# Patient Record
Sex: MALE | Race: WHITE | NOT HISPANIC OR LATINO | Employment: OTHER | URBAN - METROPOLITAN AREA
[De-identification: names, ages, dates, MRNs, and addresses within clinical notes are randomized per-mention and may not be internally consistent; named-entity substitution may affect disease eponyms.]

---

## 2018-04-24 ENCOUNTER — OFFICE VISIT CONVERTED (OUTPATIENT)
Dept: SURGERY | Facility: CLINIC | Age: 38
End: 2018-04-24
Attending: PHYSICIAN ASSISTANT

## 2019-01-23 ENCOUNTER — HOSPITAL ENCOUNTER (OUTPATIENT)
Dept: URGENT CARE | Facility: CLINIC | Age: 39
Discharge: HOME | End: 2019-01-23

## 2019-01-25 LAB — BACTERIA SPEC AEROBE CULT: NORMAL

## 2019-03-22 ENCOUNTER — HOSPITAL ENCOUNTER (OUTPATIENT)
Dept: URGENT CARE | Facility: CLINIC | Age: 39
Discharge: HOME | End: 2019-03-22

## 2019-07-09 ENCOUNTER — HOSPITAL ENCOUNTER (OUTPATIENT)
Dept: URGENT CARE | Facility: CLINIC | Age: 39
Discharge: HOME | End: 2019-07-09

## 2019-11-09 ENCOUNTER — HOSPITAL ENCOUNTER (OUTPATIENT)
Dept: URGENT CARE | Facility: CLINIC | Age: 39
Discharge: HOME | End: 2019-11-09
Attending: NURSE PRACTITIONER

## 2020-02-08 ENCOUNTER — HOSPITAL ENCOUNTER (OUTPATIENT)
Dept: URGENT CARE | Facility: CLINIC | Age: 40
Discharge: HOME | End: 2020-02-08

## 2021-05-16 VITALS — BODY MASS INDEX: 29.45 KG/M2 | WEIGHT: 210.37 LBS | RESPIRATION RATE: 12 BRPM | HEIGHT: 71 IN

## 2021-09-05 ENCOUNTER — APPOINTMENT (OUTPATIENT)
Dept: GENERAL RADIOLOGY | Facility: HOSPITAL | Age: 41
End: 2021-09-05

## 2021-09-05 ENCOUNTER — HOSPITAL ENCOUNTER (INPATIENT)
Facility: HOSPITAL | Age: 41
LOS: 6 days | Discharge: HOME | End: 2021-09-11
Attending: EMERGENCY MEDICINE | Admitting: INTERNAL MEDICINE

## 2021-09-05 DIAGNOSIS — U07.1 PNEUMONIA DUE TO COVID-19 VIRUS: Primary | ICD-10-CM

## 2021-09-05 DIAGNOSIS — J12.82 PNEUMONIA DUE TO COVID-19 VIRUS: Primary | ICD-10-CM

## 2021-09-05 DIAGNOSIS — J96.01 ACUTE RESPIRATORY FAILURE WITH HYPOXIA (HCC): ICD-10-CM

## 2021-09-05 PROBLEM — Z72.0 TOBACCO ABUSE: Status: ACTIVE | Noted: 2021-09-05

## 2021-09-05 LAB
ALBUMIN SERPL-MCNC: 3.8 G/DL (ref 3.5–5.2)
ALBUMIN SERPL-MCNC: 3.9 G/DL (ref 3.5–5.2)
ALBUMIN/GLOB SERPL: 1.1 G/DL
ALP SERPL-CCNC: 53 U/L (ref 39–117)
ALP SERPL-CCNC: 55 U/L (ref 39–117)
ALT SERPL W P-5'-P-CCNC: 88 U/L (ref 1–41)
ALT SERPL W P-5'-P-CCNC: 91 U/L (ref 1–41)
ANION GAP SERPL CALCULATED.3IONS-SCNC: 12.3 MMOL/L (ref 5–15)
AST SERPL-CCNC: 46 U/L (ref 1–40)
AST SERPL-CCNC: 46 U/L (ref 1–40)
BASOPHILS # BLD AUTO: 0.07 10*3/MM3 (ref 0–0.2)
BASOPHILS NFR BLD AUTO: 0.4 % (ref 0–1.5)
BILIRUB CONJ SERPL-MCNC: <0.2 MG/DL (ref 0–0.3)
BILIRUB INDIRECT SERPL-MCNC: ABNORMAL MG/DL
BILIRUB SERPL-MCNC: 0.3 MG/DL (ref 0–1.2)
BILIRUB SERPL-MCNC: 0.3 MG/DL (ref 0–1.2)
BUN SERPL-MCNC: 16 MG/DL (ref 6–20)
BUN/CREAT SERPL: 17.4 (ref 7–25)
CALCIUM SPEC-SCNC: 9.1 MG/DL (ref 8.6–10.5)
CHLORIDE SERPL-SCNC: 101 MMOL/L (ref 98–107)
CO2 SERPL-SCNC: 22.7 MMOL/L (ref 22–29)
CREAT SERPL-MCNC: 0.92 MG/DL (ref 0.76–1.27)
CREAT SERPL-MCNC: 0.96 MG/DL (ref 0.76–1.27)
CRP SERPL-MCNC: 5.65 MG/DL (ref 0–0.5)
D DIMER PPP FEU-MCNC: 0.83 MG/L (FEU) (ref 0–0.59)
DEPRECATED RDW RBC AUTO: 43 FL (ref 37–54)
EOSINOPHIL # BLD AUTO: 0 10*3/MM3 (ref 0–0.4)
EOSINOPHIL NFR BLD AUTO: 0 % (ref 0.3–6.2)
ERYTHROCYTE [DISTWIDTH] IN BLOOD BY AUTOMATED COUNT: 13.1 % (ref 12.3–15.4)
FERRITIN SERPL-MCNC: 242.2 NG/ML (ref 30–400)
GFR SERPL CREATININE-BSD FRML MDRD: 87 ML/MIN/1.73
GFR SERPL CREATININE-BSD FRML MDRD: 92 ML/MIN/1.73
GLOBULIN UR ELPH-MCNC: 3.4 GM/DL
GLUCOSE SERPL-MCNC: 153 MG/DL (ref 65–99)
HCT VFR BLD AUTO: 47.6 % (ref 37.5–51)
HGB BLD-MCNC: 16.2 G/DL (ref 13–17.7)
HOLD SPECIMEN: NORMAL
HOLD SPECIMEN: NORMAL
IMM GRANULOCYTES # BLD AUTO: 0.84 10*3/MM3 (ref 0–0.05)
IMM GRANULOCYTES NFR BLD AUTO: 4.9 % (ref 0–0.5)
LDH SERPL-CCNC: 419 U/L (ref 135–225)
LYMPHOCYTES # BLD AUTO: 0.57 10*3/MM3 (ref 0.7–3.1)
LYMPHOCYTES NFR BLD AUTO: 3.3 % (ref 19.6–45.3)
MCH RBC QN AUTO: 30.4 PG (ref 26.6–33)
MCHC RBC AUTO-ENTMCNC: 34 G/DL (ref 31.5–35.7)
MCV RBC AUTO: 89.3 FL (ref 79–97)
MONOCYTES # BLD AUTO: 0.78 10*3/MM3 (ref 0.1–0.9)
MONOCYTES NFR BLD AUTO: 4.5 % (ref 5–12)
NEUTROPHILS NFR BLD AUTO: 14.94 10*3/MM3 (ref 1.7–7)
NEUTROPHILS NFR BLD AUTO: 86.9 % (ref 42.7–76)
NRBC BLD AUTO-RTO: 0.2 /100 WBC (ref 0–0.2)
NT-PROBNP SERPL-MCNC: 200.7 PG/ML (ref 0–450)
PLATELET # BLD AUTO: 313 10*3/MM3 (ref 140–450)
PMV BLD AUTO: 11 FL (ref 6–12)
POTASSIUM SERPL-SCNC: 4.3 MMOL/L (ref 3.5–5.2)
PROCALCITONIN SERPL-MCNC: 0.08 NG/ML (ref 0–0.25)
PROCALCITONIN SERPL-MCNC: 0.1 NG/ML (ref 0–0.25)
PROT SERPL-MCNC: 7.2 G/DL (ref 6–8.5)
PROT SERPL-MCNC: 7.3 G/DL (ref 6–8.5)
QT INTERVAL: 411 MS
RBC # BLD AUTO: 5.33 10*6/MM3 (ref 4.14–5.8)
SODIUM SERPL-SCNC: 136 MMOL/L (ref 136–145)
TROPONIN T SERPL-MCNC: <0.01 NG/ML (ref 0–0.03)
WBC # BLD AUTO: 17.2 10*3/MM3 (ref 3.4–10.8)
WHOLE BLOOD HOLD SPECIMEN: NORMAL
WHOLE BLOOD HOLD SPECIMEN: NORMAL

## 2021-09-05 PROCEDURE — 93010 ELECTROCARDIOGRAM REPORT: CPT | Performed by: INTERNAL MEDICINE

## 2021-09-05 PROCEDURE — 84484 ASSAY OF TROPONIN QUANT: CPT

## 2021-09-05 PROCEDURE — 85025 COMPLETE CBC W/AUTO DIFF WBC: CPT | Performed by: EMERGENCY MEDICINE

## 2021-09-05 PROCEDURE — 25010000002 LEVOFLOXACIN PER 250 MG: Performed by: INTERNAL MEDICINE

## 2021-09-05 PROCEDURE — 25010000002 DEXAMETHASONE PER 1 MG: Performed by: EMERGENCY MEDICINE

## 2021-09-05 PROCEDURE — 99291 CRITICAL CARE FIRST HOUR: CPT | Performed by: FAMILY MEDICINE

## 2021-09-05 PROCEDURE — 82728 ASSAY OF FERRITIN: CPT | Performed by: FAMILY MEDICINE

## 2021-09-05 PROCEDURE — 82565 ASSAY OF CREATININE: CPT | Performed by: FAMILY MEDICINE

## 2021-09-05 PROCEDURE — 83880 ASSAY OF NATRIURETIC PEPTIDE: CPT

## 2021-09-05 PROCEDURE — 80053 COMPREHEN METABOLIC PANEL: CPT

## 2021-09-05 PROCEDURE — 94799 UNLISTED PULMONARY SVC/PX: CPT

## 2021-09-05 PROCEDURE — 86140 C-REACTIVE PROTEIN: CPT | Performed by: FAMILY MEDICINE

## 2021-09-05 PROCEDURE — 87040 BLOOD CULTURE FOR BACTERIA: CPT | Performed by: FAMILY MEDICINE

## 2021-09-05 PROCEDURE — 71045 X-RAY EXAM CHEST 1 VIEW: CPT

## 2021-09-05 PROCEDURE — 25010000002 ENOXAPARIN PER 10 MG

## 2021-09-05 PROCEDURE — 85379 FIBRIN DEGRADATION QUANT: CPT | Performed by: FAMILY MEDICINE

## 2021-09-05 PROCEDURE — 87040 BLOOD CULTURE FOR BACTERIA: CPT | Performed by: EMERGENCY MEDICINE

## 2021-09-05 PROCEDURE — 84145 PROCALCITONIN (PCT): CPT | Performed by: INTERNAL MEDICINE

## 2021-09-05 PROCEDURE — 25010000002 DEXAMETHASONE PER 1 MG: Performed by: FAMILY MEDICINE

## 2021-09-05 PROCEDURE — 99285 EMERGENCY DEPT VISIT HI MDM: CPT

## 2021-09-05 PROCEDURE — 80076 HEPATIC FUNCTION PANEL: CPT | Performed by: FAMILY MEDICINE

## 2021-09-05 PROCEDURE — 93005 ELECTROCARDIOGRAM TRACING: CPT | Performed by: EMERGENCY MEDICINE

## 2021-09-05 PROCEDURE — 84145 PROCALCITONIN (PCT): CPT | Performed by: FAMILY MEDICINE

## 2021-09-05 PROCEDURE — 83615 LACTATE (LD) (LDH) ENZYME: CPT | Performed by: FAMILY MEDICINE

## 2021-09-05 PROCEDURE — XW033E5 INTRODUCTION OF REMDESIVIR ANTI-INFECTIVE INTO PERIPHERAL VEIN, PERCUTANEOUS APPROACH, NEW TECHNOLOGY GROUP 5: ICD-10-PCS | Performed by: INTERNAL MEDICINE

## 2021-09-05 RX ORDER — SODIUM CHLORIDE 0.9 % (FLUSH) 0.9 %
10 SYRINGE (ML) INJECTION AS NEEDED
Status: DISCONTINUED | OUTPATIENT
Start: 2021-09-05 | End: 2021-09-11 | Stop reason: HOSPADM

## 2021-09-05 RX ORDER — SODIUM CHLORIDE 0.9 % (FLUSH) 0.9 %
10 SYRINGE (ML) INJECTION AS NEEDED
Status: DISCONTINUED | OUTPATIENT
Start: 2021-09-05 | End: 2021-09-05

## 2021-09-05 RX ORDER — CALCIUM CARBONATE 200(500)MG
2 TABLET,CHEWABLE ORAL 2 TIMES DAILY PRN
Status: DISCONTINUED | OUTPATIENT
Start: 2021-09-05 | End: 2021-09-11 | Stop reason: HOSPADM

## 2021-09-05 RX ORDER — DEXAMETHASONE SODIUM PHOSPHATE 10 MG/ML
10 INJECTION INTRAMUSCULAR; INTRAVENOUS ONCE
Status: COMPLETED | OUTPATIENT
Start: 2021-09-05 | End: 2021-09-05

## 2021-09-05 RX ORDER — ACETAMINOPHEN 325 MG/1
650 TABLET ORAL EVERY 4 HOURS PRN
Status: DISCONTINUED | OUTPATIENT
Start: 2021-09-05 | End: 2021-09-11 | Stop reason: HOSPADM

## 2021-09-05 RX ORDER — DEXAMETHASONE SODIUM PHOSPHATE 10 MG/ML
6 INJECTION INTRAMUSCULAR; INTRAVENOUS DAILY
Status: DISCONTINUED | OUTPATIENT
Start: 2021-09-05 | End: 2021-09-07

## 2021-09-05 RX ORDER — CHOLECALCIFEROL (VITAMIN D3) 125 MCG
5 CAPSULE ORAL NIGHTLY PRN
Status: DISCONTINUED | OUTPATIENT
Start: 2021-09-05 | End: 2021-09-11 | Stop reason: HOSPADM

## 2021-09-05 RX ORDER — ONDANSETRON 4 MG/1
4 TABLET, FILM COATED ORAL EVERY 6 HOURS PRN
Status: DISCONTINUED | OUTPATIENT
Start: 2021-09-05 | End: 2021-09-11 | Stop reason: HOSPADM

## 2021-09-05 RX ORDER — ACETAMINOPHEN 160 MG/5ML
650 SOLUTION ORAL EVERY 4 HOURS PRN
Status: DISCONTINUED | OUTPATIENT
Start: 2021-09-05 | End: 2021-09-11 | Stop reason: HOSPADM

## 2021-09-05 RX ORDER — POLYETHYLENE GLYCOL 3350 17 G/17G
17 POWDER, FOR SOLUTION ORAL DAILY PRN
Status: DISCONTINUED | OUTPATIENT
Start: 2021-09-05 | End: 2021-09-11 | Stop reason: HOSPADM

## 2021-09-05 RX ORDER — ONDANSETRON 2 MG/ML
4 INJECTION INTRAMUSCULAR; INTRAVENOUS EVERY 6 HOURS PRN
Status: DISCONTINUED | OUTPATIENT
Start: 2021-09-05 | End: 2021-09-11 | Stop reason: HOSPADM

## 2021-09-05 RX ORDER — AMOXICILLIN 250 MG
2 CAPSULE ORAL 2 TIMES DAILY
Status: DISCONTINUED | OUTPATIENT
Start: 2021-09-05 | End: 2021-09-11 | Stop reason: HOSPADM

## 2021-09-05 RX ORDER — SODIUM CHLORIDE 0.9 % (FLUSH) 0.9 %
10 SYRINGE (ML) INJECTION EVERY 12 HOURS SCHEDULED
Status: DISCONTINUED | OUTPATIENT
Start: 2021-09-05 | End: 2021-09-11 | Stop reason: HOSPADM

## 2021-09-05 RX ORDER — BISACODYL 5 MG/1
5 TABLET, DELAYED RELEASE ORAL DAILY PRN
Status: DISCONTINUED | OUTPATIENT
Start: 2021-09-05 | End: 2021-09-11 | Stop reason: HOSPADM

## 2021-09-05 RX ORDER — LEVOFLOXACIN 5 MG/ML
750 INJECTION, SOLUTION INTRAVENOUS EVERY 24 HOURS
Status: DISCONTINUED | OUTPATIENT
Start: 2021-09-05 | End: 2021-09-08

## 2021-09-05 RX ORDER — ACETAMINOPHEN 650 MG/1
650 SUPPOSITORY RECTAL EVERY 4 HOURS PRN
Status: DISCONTINUED | OUTPATIENT
Start: 2021-09-05 | End: 2021-09-11 | Stop reason: HOSPADM

## 2021-09-05 RX ORDER — BISACODYL 10 MG
10 SUPPOSITORY, RECTAL RECTAL DAILY PRN
Status: DISCONTINUED | OUTPATIENT
Start: 2021-09-05 | End: 2021-09-11 | Stop reason: HOSPADM

## 2021-09-05 RX ADMIN — SODIUM CHLORIDE, PRESERVATIVE FREE 10 ML: 5 INJECTION INTRAVENOUS at 20:03

## 2021-09-05 RX ADMIN — MINERAL OIL, PETROLATUM: 425; 568 OINTMENT OPHTHALMIC at 20:50

## 2021-09-05 RX ADMIN — MINERAL OIL, PETROLATUM: 425; 568 OINTMENT OPHTHALMIC at 23:50

## 2021-09-05 RX ADMIN — DEXAMETHASONE SODIUM PHOSPHATE 10 MG: 10 INJECTION INTRAMUSCULAR; INTRAVENOUS at 02:56

## 2021-09-05 RX ADMIN — DEXAMETHASONE SODIUM PHOSPHATE 6 MG: 10 INJECTION INTRAMUSCULAR; INTRAVENOUS at 10:23

## 2021-09-05 RX ADMIN — ENOXAPARIN SODIUM 40 MG: 40 INJECTION SUBCUTANEOUS at 10:22

## 2021-09-05 RX ADMIN — LEVOFLOXACIN 750 MG: 750 INJECTION, SOLUTION INTRAVENOUS at 13:34

## 2021-09-05 NOTE — PLAN OF CARE
Problem: Adult Inpatient Plan of Care  Goal: Plan of Care Review  Outcome: Ongoing, Progressing  Flowsheets (Taken 9/5/2021 1733)  Progress: no change  Plan of Care Reviewed With: patient  Outcome Summary: pt admitted from ER, pt cooperative and pleasant, currently saturating at 88% on airvo  Goal: Patient-Specific Goal (Individualized)  Outcome: Ongoing, Progressing  Goal: Absence of Hospital-Acquired Illness or Injury  Outcome: Ongoing, Progressing  Intervention: Identify and Manage Fall Risk  Flowsheets (Taken 9/5/2021 1508 by Vandana Chakraborty, PCT)  Safety Promotion/Fall Prevention: safety round/check completed  Intervention: Prevent Skin Injury  Flowsheets (Taken 9/5/2021 1508 by Vandana Chakraborty, PCT)  Body Position: position changed independently  Goal: Optimal Comfort and Wellbeing  Outcome: Ongoing, Progressing  Goal: Readiness for Transition of Care  Outcome: Ongoing, Progressing  Intervention: Mutually Develop Transition Plan  Flowsheets  Taken 9/5/2021 1549  Equipment Currently Used at Home: none  Taken 9/5/2021 1517  Transportation Anticipated: family or friend will provide  Patient/Family Anticipated Services at Transition: none  Patient/Family Anticipates Transition to: home with family  Taken 9/5/2021 1513  Equipment Currently Used at Home: none     Problem: Gas Exchange Impaired  Goal: Optimal Gas Exchange  Outcome: Ongoing, Progressing   Goal Outcome Evaluation:  Plan of Care Reviewed With: patient        Progress: no change  Outcome Summary: pt admitted from ER, pt cooperative and bon, currently saturating at 88% on airvo

## 2021-09-05 NOTE — PROGRESS NOTES
[facility]   Hospitalist Progress Note  Date: 2021  Patient Name: Sonu Mensah  : 1980  MRN: [id number]  Date of admission: 2021      Subjective   Subjective     Chief Complaint: SOA    Summary:   Sonu Mensah is a 41 y.o. old male patient with no significant past medical history he was diagnosed with Covid 19 on .  He reports has been having symptoms for the past 8 days.  He had fevers but they resolved..  He reports initial loss of taste and smell, now improving.  He reports cough that is nonproductive.  He reports diarrhea.  He denies nausea or vomiting.  He reports difficulty taking deep breaths.  On arrival to the emergency department the patient was tachypneic, saturating 81% on room air.  Blood work showed an elevated white blood cell count at 17,200.  Chest x-ray showed bilateral infiltrates.  The patient was started on nonrebreather, and subsequently transition to air Vo.       Interval Followup:   --Patient has been stable on interval setting since being here so will be downgraded to floor status  --We will start antibiotics due to white blood cell count of 17,000  --Procalcitonin, respiratory culture, strep and Legionella urine antigen  --Patient has anaphylactic reaction to penicillin so we will start Levaquin  --We will repeat procalcitonin in the morning      Review of Systems  10 out of 14 systems reviewed and all are negative except stated above    Objective   Objective     Vitals:   Temp:  [97.7 °F (36.5 °C)] 97.7 °F (36.5 °C)  Heart Rate:  [62-78] 65  Resp:  [24-25] 25  BP: (107-137)/(57-91) 111/57  Flow (L/min):  [15-40] 40  Physical Exam    Constitutional: Awake, alert, no acute distress   Eyes: Pupils equal, sclerae anicteric, no conjunctival injection   HENT: NCAT, mucous membranes moist   Neck: Supple, no thyromegaly, no lymphadenopathy, trachea midline   Respiratory: Bibasilar crackles on auscultation.  No respiratory distress   Cardiovascular: RRR, no murmurs,  rubs, or gallops, palpable pedal pulses bilaterally   Gastrointestinal: Positive bowel sounds, soft, nontender, nondistended   Musculoskeletal: No bilateral ankle edema, no clubbing or cyanosis to extremities   Psychiatric: Appropriate affect, cooperative   Neurologic: Oriented x 3, strength symmetric in all extremities, Cranial Nerves grossly intact to confrontation, speech clear   Skin: No rashes     Result Review    Result Review:  I have personally reviewed the results from the time of this admission to 9/5/2021 11:16 EDT and agree with these findings:  Reviewed imaging and labs    Assessment/Plan   Assessment / Plan     Assessment/Plan:  Acute hypoxemic respiratory failure  COVID-19 pneumonia  Multifocal pneumonia with leukocytosis  Tobacco abuse    Plan:  --Admit to COVID-19 unit  --Continue air Vo for oxygen saturations less than 90%  --Continue Decadron  --Continue remdesivir  --Start Levaquin due to leukocytosis  --If a.m. procalcitonin and urine studies are negative then can most likely discontinue  --Follow-up blood culture  --Respiratory culture      Discussed plan with RN.    DVT prophylaxis:  Enoxaparin  CODE STATUS:   Level Of Support Discussed With: Patient  Code Status: CPR  Medical Interventions (Level of Support Prior to Arrest): Full        Electronically signed by Amilcar Reed MD, 09/05/21, 11:16 AM EDT.

## 2021-09-05 NOTE — ED PROVIDER NOTES
Time: 1:42 AM EDT  Arrived by: private vehicle  Chief Complaint: shortness of breath  History provided by: patient  History is limited by: N/A    History of Present Illness:  Patient is a 41 y.o. year old male that presents to the emergency department with shortness of breath. Pt was seen on 8/31 at Urgent Care and COVID-19 swab came back positive. Pt breathing got worse yesterday. Pt symptoms started prior to being tested. Pt notes some mild diarrhea. Pt is taking Z-pac.         Shortness of Breath  Severity:  Moderate  Onset quality:  Gradual  Duration:  2 days  Timing:  Constant  Progression:  Improving  Chronicity:  New  Relieved by:  Oxygen  Worsened by:  Nothing  Associated symptoms: no chest pain, no cough, no fever, no neck pain, no rash and no vomiting            Similar Symptoms Previously: yes  Recently seen: yes      Patient Care Team  Primary Care Provider: Mallorie Walker MD      Past Medical History:     Allergies   Allergen Reactions   • Penicillins Anaphylaxis     Past Medical History:   Diagnosis Date   • COVID-19      Past Surgical History:   Procedure Laterality Date   • FRACTURE SURGERY     • HERNIA REPAIR     • LEG SURGERY       History reviewed. No pertinent family history.    Home Medications:  Prior to Admission medications    Medication Sig Start Date End Date Taking? Authorizing Provider   azithromycin (Zithromax Z-Robert) 250 MG tablet Take 2 tablets by mouth on day 1, then 1 tablet daily on days 2-5 8/31/21   Amos Murphy MD   brompheniramine-pseudoephedrine-DM 30-2-10 MG/5ML syrup Take 10 mL by mouth 3 (Three) Times a Day As Needed for Congestion. 8/31/21   Amos Murphy MD   predniSONE (DELTASONE) 10 MG tablet TAKE 6,5,4,3,2,1 TABS ON CONSECUTIVE DAYS 8/31/21   Amos Murphy MD   promethazine (PHENERGAN) 25 MG tablet Take 1 tablet by mouth Every 6 (Six) Hours As Needed for Nausea or Vomiting. 8/31/21   Amos Murphy MD   Testosterone Cypionate (DEPOTESTOTERONE CYPIONATE)  "200 MG/ML injection INJECT 0.5 MLS INTO THE MUSCLE EVERY WEEK 8/15/21   Emergency, Nurse [facility], RN        Social History:   PT  reports that he has never smoked. His smokeless tobacco use includes chew. He reports current alcohol use. He reports that he does not use drugs.    Record Review:  I have reviewed the patient's records in [facility].     Review of Systems  Review of Systems   Constitutional: Negative for chills and fever.   HENT: Negative for nosebleeds.    Eyes: Negative for redness.   Respiratory: Positive for shortness of breath. Negative for cough.    Cardiovascular: Negative for chest pain.   Gastrointestinal: Positive for diarrhea. Negative for vomiting.   Genitourinary: Negative for dysuria and frequency.   Musculoskeletal: Negative for back pain and neck pain.   Skin: Negative for rash.   Neurological: Negative for seizures.        Physical Exam  /79   Pulse 66   Temp 97.7 °F (36.5 °C) (Oral)   Resp 24   Ht 180.3 cm (71\")   SpO2 90%   BMI 29.85 kg/m²     Physical Exam  Vitals and nursing note reviewed.   Constitutional:       General: He is not in acute distress.     Appearance: Normal appearance. He is not toxic-appearing.   HENT:      Head: Normocephalic and atraumatic.      Nose: Nose normal.      Mouth/Throat:      Mouth: Mucous membranes are moist.   Eyes:      General: Lids are normal. No scleral icterus.     Conjunctiva/sclera: Conjunctivae normal.   Cardiovascular:      Rate and Rhythm: Normal rate and regular rhythm.      Pulses: Normal pulses.      Heart sounds: Normal heart sounds. No murmur heard.     Pulmonary:      Effort: Tachypnea and respiratory distress (mild) present.      Breath sounds: Examination of the right-upper field reveals rhonchi. Examination of the left-upper field reveals rhonchi. Examination of the right-middle field reveals rhonchi. Examination of the left-middle field reveals rhonchi. Examination of the right-lower field reveals rhonchi. Examination of the " "left-lower field reveals rhonchi. Rhonchi present.   Chest:      Chest wall: No tenderness.   Abdominal:      Palpations: Abdomen is soft.      Tenderness: There is no abdominal tenderness. There is no guarding or rebound.   Musculoskeletal:         General: No tenderness. Normal range of motion.      Cervical back: Normal range of motion and neck supple. No tenderness.      Right lower leg: No edema.      Left lower leg: No edema.   Skin:     General: Skin is warm and dry.      Findings: No rash.   Neurological:      General: No focal deficit present.      Mental Status: He is alert and oriented to person, place, and time.      Sensory: Sensation is intact. No sensory deficit.      Motor: Motor function is intact. No weakness.   Psychiatric:         Behavior: Behavior normal.                  ED Course  /79   Pulse 66   Temp 97.7 °F (36.5 °C) (Oral)   Resp 24   Ht 180.3 cm (71\")   SpO2 90%   BMI 29.85 kg/m²   Results for orders placed or performed during the hospital encounter of 09/05/21   Comprehensive Metabolic Panel    Specimen: Blood   Result Value Ref Range    Glucose 153 (H) 65 - 99 mg/dL    BUN 16 6 - 20 mg/dL    Creatinine 0.92 0.76 - 1.27 mg/dL    Sodium 136 136 - 145 mmol/L    Potassium 4.3 3.5 - 5.2 mmol/L    Chloride 101 98 - 107 mmol/L    CO2 22.7 22.0 - 29.0 mmol/L    Calcium 9.1 8.6 - 10.5 mg/dL    Total Protein 7.2 6.0 - 8.5 g/dL    Albumin 3.80 3.50 - 5.20 g/dL    ALT (SGPT) 88 (H) 1 - 41 U/L    AST (SGOT) 46 (H) 1 - 40 U/L    Alkaline Phosphatase 53 39 - 117 U/L    Total Bilirubin 0.3 0.0 - 1.2 mg/dL    eGFR Non African Amer 92 >60 mL/min/1.73    Globulin 3.4 gm/dL    A/G Ratio 1.1 g/dL    BUN/Creatinine Ratio 17.4 7.0 - 25.0    Anion Gap 12.3 5.0 - 15.0 mmol/L   BNP    Specimen: Blood   Result Value Ref Range    proBNP 200.7 0.0 - 450.0 pg/mL   Troponin    Specimen: Blood   Result Value Ref Range    Troponin T <0.010 0.000 - 0.030 ng/mL   CBC Auto Differential    Specimen: Blood "   Result Value Ref Range    WBC 17.20 (H) 3.40 - 10.80 10*3/mm3    RBC 5.33 4.14 - 5.80 10*6/mm3    Hemoglobin 16.2 13.0 - 17.7 g/dL    Hematocrit 47.6 37.5 - 51.0 %    MCV 89.3 79.0 - 97.0 fL    MCH 30.4 26.6 - 33.0 pg    MCHC 34.0 31.5 - 35.7 g/dL    RDW 13.1 12.3 - 15.4 %    RDW-SD 43.0 37.0 - 54.0 fl    MPV 11.0 6.0 - 12.0 fL    Platelets 313 140 - 450 10*3/mm3    Neutrophil % 86.9 (H) 42.7 - 76.0 %    Lymphocyte % 3.3 (L) 19.6 - 45.3 %    Monocyte % 4.5 (L) 5.0 - 12.0 %    Eosinophil % 0.0 (L) 0.3 - 6.2 %    Basophil % 0.4 0.0 - 1.5 %    Immature Grans % 4.9 (H) 0.0 - 0.5 %    Neutrophils, Absolute 14.94 (H) 1.70 - 7.00 10*3/mm3    Lymphocytes, Absolute 0.57 (L) 0.70 - 3.10 10*3/mm3    Monocytes, Absolute 0.78 0.10 - 0.90 10*3/mm3    Eosinophils, Absolute 0.00 0.00 - 0.40 10*3/mm3    Basophils, Absolute 0.07 0.00 - 0.20 10*3/mm3    Immature Grans, Absolute 0.84 (H) 0.00 - 0.05 10*3/mm3    nRBC 0.2 0.0 - 0.2 /100 WBC   Ferritin    Specimen: Blood   Result Value Ref Range    Ferritin 242.20 30.00 - 400.00 ng/mL   D-dimer, Quantitative    Specimen: Blood   Result Value Ref Range    D-Dimer, Quantitative 0.83 (H) 0.00 - 0.59 mg/L (FEU)   Lactate Dehydrogenase    Specimen: Blood   Result Value Ref Range     (H) 135 - 225 U/L   Procalcitonin    Specimen: Blood   Result Value Ref Range    Procalcitonin 0.08 0.00 - 0.25 ng/mL   C-reactive Protein    Specimen: Blood   Result Value Ref Range    C-Reactive Protein 5.65 (H) 0.00 - 0.50 mg/dL   Hepatic Function Panel    Specimen: Blood   Result Value Ref Range    Total Protein 7.3 6.0 - 8.5 g/dL    Albumin 3.90 3.50 - 5.20 g/dL    ALT (SGPT) 91 (H) 1 - 41 U/L    AST (SGOT) 46 (H) 1 - 40 U/L    Alkaline Phosphatase 55 39 - 117 U/L    Total Bilirubin 0.3 0.0 - 1.2 mg/dL    Bilirubin, Direct <0.2 0.0 - 0.3 mg/dL    Bilirubin, Indirect     Creatinine, Serum    Specimen: Blood   Result Value Ref Range    Creatinine 0.96 0.76 - 1.27 mg/dL    eGFR Non  Amer 87 >60  mL/min/1.73   ECG 12 Lead   Result Value Ref Range    QT Interval 411 ms   Green Top (Gel)   Result Value Ref Range    Extra Tube Hold for add-ons.    Lavender Top   Result Value Ref Range    Extra Tube hold for add-on    Gold Top - SST   Result Value Ref Range    Extra Tube Hold for add-ons.    Light Blue Top   Result Value Ref Range    Extra Tube hold for add-on      Medications   sodium chloride 0.9 % flush 10 mL (has no administration in time range)   dexamethasone (DECADRON) injection 6 mg (has no administration in time range)   remdesivir 200 mg in sodium chloride 0.9 % 290 mL IVPB (powder vial) (0 mg Intravenous Stopped 9/5/21 0622)     Followed by   remdesivir 100 mg in 270 mL NS (has no administration in time range)   Pharmacy Consult - Remdesivir (has no administration in time range)   artificial tears ophthalmic ointment (has no administration in time range)   artificial tears ophthalmic ointment (has no administration in time range)   dexamethasone (DECADRON) injection 10 mg (10 mg Intravenous Given 9/5/21 0256)     XR Chest 1 View    Result Date: 9/5/2021  Narrative: PROCEDURE: XR CHEST 1 VW  COMPARISON: None.  INDICATIONS: SHORTNESS OF BREATH.  FINDINGS: A single AP upright portable view of the chest is provided for review.  Bilateral infiltrates are seen.  The findings may represent infectious multifocal pneumonia.  No cardiomediastinal enlargement.  No pneumothorax.  No pneumomediastinum.  No pleural effusion.  There is slight pulmonary hypoinflation.  External artifacts obscure detail. Chronic calcified granulomatous disease involves the chest.   CONCLUSION: Bilateral infiltrates are seen.  The findings may represent infectious multifocal pneumonia.       ABBY STEPHENS JR, MD       Electronically Signed and Approved By: ABBY STEPHENS JR, MD on 9/05/2021 at 1:46               Procedures/EKGs:  Procedures          Medical Decision Making:                     [facility]     Patient with COVID-19 pneumonia.   Chest x-ray shows diffuse bilateral infiltrates consistent with multifocal pneumonia.  Patient hypoxic on arrival to the emergency department requiring significant supplemental oxygen to maintain oxygen saturation.  Ultimately patient placed on Vapotherm therapy at 75% with 40 L.  He is comfortable speaking in full sentences after supplemental oxygen.  Patient discussed with hospitalist for admission.  The patient's airway is intact, vital signs, and respiratory status are safe for admission at this time.    Total Critical Care time of 35  minutes. Total critical care time documented does not include time spent on separately billed procedures for services of nurses or physician assistants. I personally saw and examined the patient. I have reviewed all diagnostic interpretations and treatment plans as written. I was present for the key portions of any procedures performed and the inclusive time noted in any critical care statement. Critical care time includes patient management by me, time spent at the patients bedside, time to review lab and imaging results, discussing patient care, documentation in the medical record, and time spent with family or caregiver.      Final diagnoses:   Pneumonia due to COVID-19 virus   Acute respiratory failure with hypoxia (CMS/HCC)        Disposition:  ED Disposition     ED Disposition Condition Comment    Decision to Admit  Level of Care: Critical Care [6]   Diagnosis: Pneumonia due to COVID-19 virus [[id number]]   Admitting Physician: RAMIN VIDAL [[id number]]   Attending Physician: RAMIN VIDAL [[id number]]   Isolate for COVID?: Yes [1]   Certification: I Certify That Inpatient Hospital Services Are Medically Necessary For Greater Than 2 Midnights            Documentation assistance provided by Crystal Burtno acting as scribe for Good Pang MD. Information recorded by the scribe was done at my direction and has been verified and validated by me.         Crystal Burton  09/05/21 0214       Good Pang MD  09/05/21 3570

## 2021-09-05 NOTE — H&P
History and Physical   Sonu Mensah , :  1980, MRN:  [id number]    Chief complaint: Low oxygen saturation    Assessment/Plan       Acute respiratory failure with hypoxia (CMS/HCC)    Pneumonia due to COVID-19 virus    Tobacco abuse      • Acute respiratory failure with hypoxia: The patient presents to the emergency department due to low oxygen saturations at home over the past 2 days.  He reports on the day of presentation his saturations were in the 70s.  He was initially on nonrebreather, was transitioned to air Vo.  He is on 40 L at 75% and saturating 91%.  Secondary to Covid.  Wean oxygen as tolerated.  Inpatient proning.    • Pneumonia due to COVID-19: The patient tested positive for COVID-19 on 2021.  Reports symptoms started on 2021.  He has been monitoring his oxygen saturations at home, have been maintaining in the 70s today.  Started on Decadron, remdesivir.  Order COVID-19 labs.  Reevaluate in the morning for possible escalation of medications.    • Tobacco abuse: The patient uses smokeless tobacco.  Counseled on cessation.    • Clinical course will dictate further medical management.    DVT Prophylaxis: Lovenox  Code Status: Full    Reviewed patients labs and imaging, and discussed with patient and nurse at bedside.    Patient risk level:  Patient comorbidities and risk factors make patient a poor candidate for outpatient management due to concerns of deterioration.    Total time spent on admission of this critically ill patient with Covid pneumonia, acute respiratory failure with hypoxia requiring air Vo: 70 minutes    History of Present illness     Sonu Mensah is a 41 y.o. old male patient who presents with low oxygen saturations, shortness of breath.  The patient has no known medical history.    The patient reports he was diagnosed with COVID-19 on 2021.  He reports has been having symptoms for the past 8 days.  He reports his fevers have resolved.  He reports initial  loss of taste and smell, now improving.  He reports cough that is nonproductive.  He reports diarrhea.  He denies nausea or vomiting.  He reports difficulty taking deep breaths.    ED course: On arrival to the emergency department the patient was tachypneic, saturating 81% on room air.  Blood work showed an elevated white blood cell count at 17,200.  Chest x-ray showed bilateral infiltrates.  The patient was started on nonrebreather, and subsequently transition to air Vo.  The hospital service was asked to admit the patient for further evaluation and management.    Old records were reviewed which revealed no previous hospitalizations.    Review of Systems     General:  Denies fevers, chills, weight loss/gain or night sweats.  HEENT: Denies dysphagia, hearing changes, rhinorrhea, visual changes or nasal congestion.  Respiratory: Reports cough, dyspnea.  Denies sputum changes, wheezing or hemoptysis.  Denies pleuritic chest pain.  Cardiovascular: Denies chest pain, palpitations, dyspnea on exertion or orthopnea. Denies chest pressure. Denies lower extremity edema.  Gastrointestinal: Denies abdominal pain, nausea, vomiting.  Reports mild diarrhea.   Genitourinary: Denies dysuria, frequency or hesitancy. Denies incontinence, urgency or hematuria.  Musculoskeletal: Denies joint effusions, joint tenderness, joint stiffness or myalgias.  Endocrine: Denies heat or cold intolerance.  Reports fatigue.    Hematologic: Denies bleeding, bruising or swollen glands.  Psychiatric: Denies anxiety or depression.  Neurologic: Denies confusion, headaches, numbness or visual changes.  Skin: Denies rash, edema or open wounds.    Past Medical/Surgical/Social/Family History/Allergies     Past Medical History:   Diagnosis Date   • COVID-19        Past Surgical History:   Procedure Laterality Date   • FRACTURE SURGERY     • HERNIA REPAIR     • LEG SURGERY         Social History     Socioeconomic History   • Marital status:      Spouse  name: Not on file   • Number of children: Not on file   • Years of education: Not on file   • Highest education level: Not on file   Tobacco Use   • Smoking status: Never Smoker   • Smokeless tobacco: Current User     Types: Chew   Vaping Use   • Vaping Use: Never used   Substance and Sexual Activity   • Alcohol use: Yes     Comment: occ   • Drug use: Never   • Sexual activity: Defer       History reviewed. No pertinent family history.    Allergies   Allergen Reactions   • Penicillins Anaphylaxis       The above past medical history, past surgical history, social history, family history allergies and home medications were personally reviewed by me today and corrected as needed  Home Medications   (Not in a hospital admission)    Physical Exam   Vital signs:  Temperature Blood Pressure Heart Rate Resp Rate SpO2   Temp: 97.7 °F (36.5 °C) BP: 137/72 Heart Rate: 75 Resp: 24 SpO2: 93 %     HEENT: Head is atraumatic, extraocular movements are intact. Oropharynx is normal.  Neck: Supple, no carotid bruits or cervical lymphadenopathy.  Cardiovascular: Regular rate and rhythm. No murmurs, gallops or rubs. No peripheral edema. No JVD.  Lungs: Bibasilar crackles to auscultation.  Abdomen: Normal bowel sounds in all 4 quadrants. No rebound, guarding or tenderness.    Chest: Normal inspection. Equal rise and fall. No retractions noted.  Constitutional: Well-nourished, well-developed.  Resting comfortably on air Vo.  Lymphatic: No cervical lymphadenopathy.  Extremities: 5/5 upper and lower extremity strength. Normal range of motion.  No clubbing, cyanosis or edema.  Neurological: Alert and oriented x3. No numbness or tingling.  Psychological: Normal mood and affect. Well kempt. Normal eye contact.  Skin: No rash, cellulitis, swelling or bruising.    Labs     Results from last 7 days   Lab Units 09/05/21  0105   WBC 10*3/mm3 17.20*   HEMOGLOBIN g/dL 16.2   HEMATOCRIT % 47.6   PLATELETS 10*3/mm3 313     Results from last 7 days    Lab Units 09/05/21  0105   SODIUM mmol/L 136   POTASSIUM mmol/L 4.3   CHLORIDE mmol/L 101   CO2 mmol/L 22.7   BUN mg/dL 16   CREATININE mg/dL 0.92   CALCIUM mg/dL 9.1     Results from last 7 days   Lab Units 09/05/21  0105   ALT (SGPT) U/L 88*   AST (SGOT) U/L 46*   ALK PHOS U/L 53   BILIRUBIN mg/dL 0.3                   Invalid input(s): CPK, MASSCKMB        I reviewed patient's labs from today and also previous labs while reviewing old records   Imaging and Other procedures     Chest X ray: My independent assessment showed bibasilar infiltrates.  EKG: Pending    I reviewed patient's imaging and other testing from today and also in the past including but not limited to imaging, previous imaging, EKG, previous EKGs, echocardiogram, and other procedures if any and previously done by reviewing old records  Current Medications   Scheduled Meds:dexamethasone, 10 mg, Intravenous, Once      Continuous Infusions:     [unfilled]  09/05/21  02:55 EDT

## 2021-09-06 LAB
ALBUMIN SERPL-MCNC: 3.3 G/DL (ref 3.5–5.2)
ALBUMIN/GLOB SERPL: 1 G/DL
ALP SERPL-CCNC: 55 U/L (ref 39–117)
ALT SERPL W P-5'-P-CCNC: 95 U/L (ref 1–41)
ANION GAP SERPL CALCULATED.3IONS-SCNC: 11 MMOL/L (ref 5–15)
AST SERPL-CCNC: 37 U/L (ref 1–40)
BASOPHILS # BLD AUTO: 0.08 10*3/MM3 (ref 0–0.2)
BASOPHILS NFR BLD AUTO: 0.4 % (ref 0–1.5)
BILIRUB CONJ SERPL-MCNC: <0.2 MG/DL (ref 0–0.3)
BILIRUB SERPL-MCNC: 0.4 MG/DL (ref 0–1.2)
BUN SERPL-MCNC: 18 MG/DL (ref 6–20)
BUN/CREAT SERPL: 15.7 (ref 7–25)
CALCIUM SPEC-SCNC: 9 MG/DL (ref 8.6–10.5)
CHLORIDE SERPL-SCNC: 101 MMOL/L (ref 98–107)
CO2 SERPL-SCNC: 23 MMOL/L (ref 22–29)
CREAT SERPL-MCNC: 1.15 MG/DL (ref 0.76–1.27)
DEPRECATED RDW RBC AUTO: 43.8 FL (ref 37–54)
EOSINOPHIL # BLD AUTO: 0 10*3/MM3 (ref 0–0.4)
EOSINOPHIL NFR BLD AUTO: 0 % (ref 0.3–6.2)
ERYTHROCYTE [DISTWIDTH] IN BLOOD BY AUTOMATED COUNT: 13.2 % (ref 12.3–15.4)
GFR SERPL CREATININE-BSD FRML MDRD: 71 ML/MIN/1.73
GLOBULIN UR ELPH-MCNC: 3.2 GM/DL
GLUCOSE SERPL-MCNC: 90 MG/DL (ref 65–99)
HCT VFR BLD AUTO: 49.4 % (ref 37.5–51)
HGB BLD-MCNC: 16.5 G/DL (ref 13–17.7)
IMM GRANULOCYTES # BLD AUTO: 0.94 10*3/MM3 (ref 0–0.05)
IMM GRANULOCYTES NFR BLD AUTO: 4.6 % (ref 0–0.5)
LARGE PLATELETS: NORMAL
LYMPHOCYTES # BLD AUTO: 1.2 10*3/MM3 (ref 0.7–3.1)
LYMPHOCYTES NFR BLD AUTO: 5.8 % (ref 19.6–45.3)
MAGNESIUM SERPL-MCNC: 2.2 MG/DL (ref 1.6–2.6)
MCH RBC QN AUTO: 30.3 PG (ref 26.6–33)
MCHC RBC AUTO-ENTMCNC: 33.4 G/DL (ref 31.5–35.7)
MCV RBC AUTO: 90.6 FL (ref 79–97)
MONOCYTES # BLD AUTO: 1.16 10*3/MM3 (ref 0.1–0.9)
MONOCYTES NFR BLD AUTO: 5.7 % (ref 5–12)
NEUTROPHILS NFR BLD AUTO: 17.14 10*3/MM3 (ref 1.7–7)
NEUTROPHILS NFR BLD AUTO: 83.5 % (ref 42.7–76)
NRBC BLD AUTO-RTO: 0 /100 WBC (ref 0–0.2)
PLATELET # BLD AUTO: 335 10*3/MM3 (ref 140–450)
PMV BLD AUTO: 11.3 FL (ref 6–12)
POTASSIUM SERPL-SCNC: 4.5 MMOL/L (ref 3.5–5.2)
PROCALCITONIN SERPL-MCNC: 0.06 NG/ML (ref 0–0.25)
PROT SERPL-MCNC: 6.5 G/DL (ref 6–8.5)
RBC # BLD AUTO: 5.45 10*6/MM3 (ref 4.14–5.8)
RBC MORPH BLD: NORMAL
SMALL PLATELETS BLD QL SMEAR: ADEQUATE
SODIUM SERPL-SCNC: 135 MMOL/L (ref 136–145)
WBC # BLD AUTO: 20.52 10*3/MM3 (ref 3.4–10.8)
WBC MORPH BLD: NORMAL

## 2021-09-06 PROCEDURE — 25010000002 DEXAMETHASONE PER 1 MG: Performed by: FAMILY MEDICINE

## 2021-09-06 PROCEDURE — 94799 UNLISTED PULMONARY SVC/PX: CPT

## 2021-09-06 PROCEDURE — 99233 SBSQ HOSP IP/OBS HIGH 50: CPT | Performed by: INTERNAL MEDICINE

## 2021-09-06 PROCEDURE — 80053 COMPREHEN METABOLIC PANEL: CPT | Performed by: INTERNAL MEDICINE

## 2021-09-06 PROCEDURE — 84145 PROCALCITONIN (PCT): CPT | Performed by: INTERNAL MEDICINE

## 2021-09-06 PROCEDURE — 25010000002 ENOXAPARIN PER 10 MG

## 2021-09-06 PROCEDURE — 82248 BILIRUBIN DIRECT: CPT | Performed by: FAMILY MEDICINE

## 2021-09-06 PROCEDURE — 25010000002 LEVOFLOXACIN PER 250 MG: Performed by: INTERNAL MEDICINE

## 2021-09-06 PROCEDURE — 85025 COMPLETE CBC W/AUTO DIFF WBC: CPT | Performed by: INTERNAL MEDICINE

## 2021-09-06 PROCEDURE — 36415 COLL VENOUS BLD VENIPUNCTURE: CPT | Performed by: FAMILY MEDICINE

## 2021-09-06 PROCEDURE — 85007 BL SMEAR W/DIFF WBC COUNT: CPT | Performed by: INTERNAL MEDICINE

## 2021-09-06 PROCEDURE — 83735 ASSAY OF MAGNESIUM: CPT | Performed by: INTERNAL MEDICINE

## 2021-09-06 RX ORDER — TETRAHYDROZOLINE HCL 0.05 %
2 DROPS OPHTHALMIC (EYE) 4 TIMES DAILY PRN
Status: DISCONTINUED | OUTPATIENT
Start: 2021-09-06 | End: 2021-09-11 | Stop reason: HOSPADM

## 2021-09-06 RX ORDER — FLUTICASONE PROPIONATE 50 MCG
2 SPRAY, SUSPENSION (ML) NASAL DAILY
Status: DISCONTINUED | OUTPATIENT
Start: 2021-09-06 | End: 2021-09-11 | Stop reason: HOSPADM

## 2021-09-06 RX ADMIN — MINERAL OIL, PETROLATUM: 425; 568 OINTMENT OPHTHALMIC at 08:48

## 2021-09-06 RX ADMIN — MINERAL OIL, PETROLATUM: 425; 568 OINTMENT OPHTHALMIC at 02:58

## 2021-09-06 RX ADMIN — FLUTICASONE PROPIONATE 2 SPRAY: 50 SPRAY, METERED NASAL at 17:37

## 2021-09-06 RX ADMIN — MINERAL OIL, PETROLATUM: 425; 568 OINTMENT OPHTHALMIC at 16:59

## 2021-09-06 RX ADMIN — SODIUM CHLORIDE, PRESERVATIVE FREE 10 ML: 5 INJECTION INTRAVENOUS at 08:48

## 2021-09-06 RX ADMIN — DEXAMETHASONE SODIUM PHOSPHATE 6 MG: 10 INJECTION INTRAMUSCULAR; INTRAVENOUS at 08:47

## 2021-09-06 RX ADMIN — SODIUM CHLORIDE, PRESERVATIVE FREE 10 ML: 5 INJECTION INTRAVENOUS at 21:11

## 2021-09-06 RX ADMIN — ACETAMINOPHEN 650 MG: 325 TABLET ORAL at 11:23

## 2021-09-06 RX ADMIN — MINERAL OIL, PETROLATUM: 425; 568 OINTMENT OPHTHALMIC at 11:23

## 2021-09-06 RX ADMIN — LEVOFLOXACIN 750 MG: 750 INJECTION, SOLUTION INTRAVENOUS at 11:23

## 2021-09-06 RX ADMIN — ENOXAPARIN SODIUM 40 MG: 40 INJECTION SUBCUTANEOUS at 08:48

## 2021-09-06 RX ADMIN — REMDESIVIR 100 MG: 100 INJECTION, POWDER, LYOPHILIZED, FOR SOLUTION INTRAVENOUS at 16:58

## 2021-09-06 NOTE — PLAN OF CARE
Goal Outcome Evaluation:  Plan of Care Reviewed With: patient        Progress: improving  Outcome Summary: Pt has been improving on the airvo. He is now weaned down to 40 L and 60 %. Still unable to cough anything up. Pt able to rest in between care, very pleasant pt. Rosalva Mortensen RN

## 2021-09-06 NOTE — PROGRESS NOTES
[facility]   Hospitalist Progress Note  Date: 2021  Patient Name: Sonu Mensah  : 1980  MRN: [id number]  Date of admission: 2021      Subjective   Subjective     Chief Complaint: SOA    Summary:   Sonu Mensah is a 41 y.o. old male patient with no significant past medical history he was diagnosed with Covid 19 on .  He reports has been having symptoms for the past 8 days.  He had fevers but they resolved..  He reports initial loss of taste and smell, now improving.  He reports cough that is nonproductive.  He reports diarrhea.  He denies nausea or vomiting.  He reports difficulty taking deep breaths.  On arrival to the emergency department the patient was tachypneic, saturating 81% on room air.  Blood work showed an elevated white blood cell count at 17,200.  Chest x-ray showed bilateral infiltrates.  The patient was started on nonrebreather, and subsequently transition to air Vo.    Currently getting Decadron and remdesivir for COVID-19.  Treating community-acquired pneumonia with Levaquin.     Interval Followup:   --Patient seems to be doing better today with decreasing air Vo requirements  --Continue Decadron and remdesivir  --Continue Levaquin for 5 days to treat community-acquired pneumonia  --Patient states that he feels better no issues    Review of Systems  10 out of 14 systems reviewed and all are negative except stated above    Objective   Objective     Vitals:   Temp:  [97.9 °F (36.6 °C)-98.6 °F (37 °C)] 98.6 °F (37 °C)  Heart Rate:  [56-65] 65  Resp:  [18-20] 20  BP: (121-135)/(72-88) 123/79  Flow (L/min):  [40-45] 40  Physical Exam    Constitutional: Awake, alert, no acute distress   Eyes: Pupils equal, sclerae anicteric, no conjunctival injection   HENT: NCAT, mucous membranes moist   Neck: Supple, no thyromegaly, no lymphadenopathy, trachea midline   Respiratory: Bibasilar crackles on auscultation.  No respiratory distress   Cardiovascular: RRR, no murmurs, rubs, or  gallops, palpable pedal pulses bilaterally   Gastrointestinal: Positive bowel sounds, soft, nontender, nondistended   Musculoskeletal: No bilateral ankle edema, no clubbing or cyanosis to extremities   Psychiatric: Appropriate affect, cooperative   Neurologic: Oriented x 3, strength symmetric in all extremities, Cranial Nerves grossly intact to confrontation, speech clear   Skin: No rashes     Result Review    Result Review:  I have personally reviewed the results from the time of this admission to 9/6/2021 15:16 EDT and agree with these findings:  Reviewed imaging and labs    Assessment/Plan   Assessment / Plan     Assessment/Plan:  Acute hypoxemic respiratory failure  COVID-19 pneumonia  Multifocal pneumonia  Tobacco abuse  Leukocytosis most likely driven by steroids at this time    Plan:  --Admit to COVID-19 unit  --Continue air Vo for oxygen saturations less than 90%  --Continue Decadron  --Continue remdesivir  --Continue Levaquin  --Follow-up blood culture  --Follow-up respiratory culture  --Strep and and Legionella urine antigen      Discussed plan with RN.    DVT prophylaxis:  Enoxaparin  CODE STATUS:   Level Of Support Discussed With: Patient  Code Status: CPR  Medical Interventions (Level of Support Prior to Arrest): Full    Electronically signed by Amilcar Reed MD, 09/06/21, 3:17 PM EDT.

## 2021-09-06 NOTE — CASE MANAGEMENT/SOCIAL WORK
Discharge Planning Assessment   [place]     Patient Name: Sonu Mensah  MRN: [id number]  Today's Date: 9/6/2021    Admit Date: 9/5/2021    Discharge Needs Assessment     Row Name 09/06/21 0931       Living Environment    Lives With  spouse;child(paula), dependent;child(paula), adult    Current Living Arrangements  home/apartment/condo    Primary Care Provided by  self    Provides Primary Care For  no one    Family Caregiver if Needed  child(paula), adult;spouse    Quality of Family Relationships  supportive       Resource/Environmental Concerns    Resource/Environmental Concerns  none       Transition Planning    Patient/Family Anticipates Transition to  home with family    Patient/Family Anticipated Services at Transition  none    Transportation Anticipated  family or friend will provide       Discharge Needs Assessment    Equipment Currently Used at Home  none    Equipment Needed After Discharge  nebulizer;oxygen    Discharge Coordination/Progress  SW placed call to pt to complete assessment. Pt is normally independent at home. Pt has PCP and uses [facility] in [place]. Pt does not have any DME at home. Pt plans to return home at discharge. SW following for possible home oxygen and nebulizer needs.        Discharge Plan    No documentation.       Continued Care and Services - Admitted Since 9/5/2021    Coordination has not been started for this encounter.         Demographic Summary     Row Name 09/06/21 0918       General Information    Admission Type  inpatient    Arrived From  emergency department    Reason for Consult  discharge planning    Preferred Language  English     Used During This Interaction  no        Functional Status     Row Name 09/06/21 0931       Functional Status    Usual Activity Tolerance  excellent    Current Activity Tolerance  excellent       Functional Status, IADL    Medications  independent    Meal Preparation  independent    Housekeeping  independent    Laundry  independent     Shopping  independent        Psychosocial    No documentation.       Abuse/Neglect    No documentation.       Legal    No documentation.       Substance Abuse    No documentation.       Patient Forms    No documentation.           GERARDO Davila

## 2021-09-06 NOTE — PLAN OF CARE
Problem: Adult Inpatient Plan of Care  Goal: Plan of Care Review  Outcome: Ongoing, Progressing  Flowsheets  Taken 9/6/2021 1724 by Mulu Dale RN  Outcome Summary: pt improving on airvo, wife is attentive to pt. no concerns or worries discussed from pt  Taken 9/6/2021 0407 by Rosalva Mortensen RN  Progress: improving  Plan of Care Reviewed With: patient  Goal: Patient-Specific Goal (Individualized)  Outcome: Ongoing, Progressing  Goal: Absence of Hospital-Acquired Illness or Injury  Outcome: Ongoing, Progressing  Intervention: Identify and Manage Fall Risk  Flowsheets  Taken 9/6/2021 1239 by Claudia Sanderson PCT  Safety Promotion/Fall Prevention: safety round/check completed  Taken 9/6/2021 0900 by Mulu Dlae RN  Safety Promotion/Fall Prevention: safety round/check completed  Intervention: Prevent Skin Injury  Flowsheets (Taken 9/6/2021 1239 by Claudia Sanderson PCT)  Body Position: position changed independently  Intervention: Prevent and Manage VTE (venous thromboembolism) Risk  Flowsheets (Taken 9/5/2021 1945 by Rosalva Mortensen, RN)  VTE Prevention/Management: patient refused intervention  Intervention: Prevent Infection  Flowsheets  Taken 9/6/2021 1239 by Claudia Sanderson PCT  Infection Prevention: single patient room provided  Taken 9/6/2021 0900 by Mulu Dale RN  Infection Prevention:   visitors restricted/screened   single patient room provided   personal protective equipment utilized   rest/sleep promoted   equipment surfaces disinfected   hand hygiene promoted  Goal: Optimal Comfort and Wellbeing  Outcome: Ongoing, Progressing  Intervention: Provide Person-Centered Care  Flowsheets (Taken 9/6/2021 0900)  Trust Relationship/Rapport:   care explained   questions answered   thoughts/feelings acknowledged  Goal: Readiness for Transition of Care  Outcome: Ongoing, Progressing  Intervention: Mutually Develop Transition Plan  Flowsheets (Taken 9/6/2021 0931 by Alyssa Dewey MSW)  Equipment  Needed After Discharge:   nebulizer   oxygen  Equipment Currently Used at Home: none  Transportation Anticipated: family or friend will provide  Patient/Family Anticipated Services at Transition: none  Patient/Family Anticipates Transition to: home with family     Problem: Gas Exchange Impaired  Goal: Optimal Gas Exchange  Outcome: Ongoing, Progressing  Intervention: Optimize Oxygenation and Ventilation  Flowsheets (Taken 9/6/2021 0500 by Steph Danielson, PCT)  Head of Bed (HOB): HOB elevated   Goal Outcome Evaluation:  Plan of Care Reviewed With: patient        Progress: no change  Outcome Summary: pt improving on airvo, wife is attentive to pt. no concerns or worries discussed from pt

## 2021-09-07 LAB
ALBUMIN SERPL-MCNC: 3.5 G/DL (ref 3.5–5.2)
ALBUMIN/GLOB SERPL: 1.1 G/DL
ALP SERPL-CCNC: 61 U/L (ref 39–117)
ALT SERPL W P-5'-P-CCNC: 72 U/L (ref 1–41)
ANION GAP SERPL CALCULATED.3IONS-SCNC: 12.7 MMOL/L (ref 5–15)
AST SERPL-CCNC: 23 U/L (ref 1–40)
BILIRUB CONJ SERPL-MCNC: <0.2 MG/DL (ref 0–0.3)
BILIRUB SERPL-MCNC: 0.5 MG/DL (ref 0–1.2)
BUN SERPL-MCNC: 22 MG/DL (ref 6–20)
BUN/CREAT SERPL: 19.5 (ref 7–25)
CALCIUM SPEC-SCNC: 8.7 MG/DL (ref 8.6–10.5)
CHLORIDE SERPL-SCNC: 100 MMOL/L (ref 98–107)
CO2 SERPL-SCNC: 21.3 MMOL/L (ref 22–29)
CREAT SERPL-MCNC: 1.13 MG/DL (ref 0.76–1.27)
DEPRECATED RDW RBC AUTO: 42.6 FL (ref 37–54)
ERYTHROCYTE [DISTWIDTH] IN BLOOD BY AUTOMATED COUNT: 12.9 % (ref 12.3–15.4)
GFR SERPL CREATININE-BSD FRML MDRD: 72 ML/MIN/1.73
GLOBULIN UR ELPH-MCNC: 3.2 GM/DL
GLUCOSE SERPL-MCNC: 76 MG/DL (ref 65–99)
HCT VFR BLD AUTO: 49.3 % (ref 37.5–51)
HGB BLD-MCNC: 16.8 G/DL (ref 13–17.7)
HYPOCHROMIA BLD QL: ABNORMAL
LARGE PLATELETS: ABNORMAL
LYMPHOCYTES # BLD MANUAL: 1.67 10*3/MM3 (ref 0.7–3.1)
LYMPHOCYTES NFR BLD MANUAL: 6 % (ref 19.6–45.3)
LYMPHOCYTES NFR BLD MANUAL: 6 % (ref 5–12)
MAGNESIUM SERPL-MCNC: 2.2 MG/DL (ref 1.6–2.6)
MCH RBC QN AUTO: 30.5 PG (ref 26.6–33)
MCHC RBC AUTO-ENTMCNC: 34.1 G/DL (ref 31.5–35.7)
MCV RBC AUTO: 89.6 FL (ref 79–97)
MONOCYTES # BLD AUTO: 1 10*3/MM3 (ref 0.1–0.9)
NEUTROPHILS # BLD AUTO: 14.04 10*3/MM3 (ref 1.7–7)
NEUTROPHILS NFR BLD MANUAL: 83 % (ref 42.7–76)
NEUTS BAND NFR BLD MANUAL: 1 % (ref 0–5)
NRBC SPEC MANUAL: 2 /100 WBC (ref 0–0.2)
PLATELET # BLD AUTO: 309 10*3/MM3 (ref 140–450)
PMV BLD AUTO: 12.7 FL (ref 6–12)
POTASSIUM SERPL-SCNC: 4 MMOL/L (ref 3.5–5.2)
PROCALCITONIN SERPL-MCNC: 0.03 NG/ML (ref 0–0.25)
PROT SERPL-MCNC: 6.7 G/DL (ref 6–8.5)
RBC # BLD AUTO: 5.5 10*6/MM3 (ref 4.14–5.8)
SMALL PLATELETS BLD QL SMEAR: ADEQUATE
SODIUM SERPL-SCNC: 134 MMOL/L (ref 136–145)
VARIANT LYMPHS NFR BLD MANUAL: 4 % (ref 0–5)
WBC # BLD AUTO: 16.71 10*3/MM3 (ref 3.4–10.8)
WBC MORPH BLD: NORMAL

## 2021-09-07 PROCEDURE — 25010000002 LEVOFLOXACIN PER 250 MG: Performed by: INTERNAL MEDICINE

## 2021-09-07 PROCEDURE — 99291 CRITICAL CARE FIRST HOUR: CPT | Performed by: INTERNAL MEDICINE

## 2021-09-07 PROCEDURE — 85025 COMPLETE CBC W/AUTO DIFF WBC: CPT | Performed by: INTERNAL MEDICINE

## 2021-09-07 PROCEDURE — 84145 PROCALCITONIN (PCT): CPT | Performed by: INTERNAL MEDICINE

## 2021-09-07 PROCEDURE — 85007 BL SMEAR W/DIFF WBC COUNT: CPT | Performed by: INTERNAL MEDICINE

## 2021-09-07 PROCEDURE — 94799 UNLISTED PULMONARY SVC/PX: CPT

## 2021-09-07 PROCEDURE — 80053 COMPREHEN METABOLIC PANEL: CPT | Performed by: INTERNAL MEDICINE

## 2021-09-07 PROCEDURE — 82248 BILIRUBIN DIRECT: CPT | Performed by: FAMILY MEDICINE

## 2021-09-07 PROCEDURE — 25010000002 DEXAMETHASONE PER 1 MG: Performed by: INTERNAL MEDICINE

## 2021-09-07 PROCEDURE — 83735 ASSAY OF MAGNESIUM: CPT | Performed by: INTERNAL MEDICINE

## 2021-09-07 PROCEDURE — 25010000002 ENOXAPARIN PER 10 MG

## 2021-09-07 PROCEDURE — 25010000002 FUROSEMIDE PER 20 MG: Performed by: INTERNAL MEDICINE

## 2021-09-07 PROCEDURE — 94760 N-INVAS EAR/PLS OXIMETRY 1: CPT

## 2021-09-07 PROCEDURE — 99233 SBSQ HOSP IP/OBS HIGH 50: CPT | Performed by: INTERNAL MEDICINE

## 2021-09-07 RX ORDER — ARFORMOTEROL TARTRATE 15 UG/2ML
15 SOLUTION RESPIRATORY (INHALATION)
Status: DISCONTINUED | OUTPATIENT
Start: 2021-09-07 | End: 2021-09-11 | Stop reason: HOSPADM

## 2021-09-07 RX ORDER — BUDESONIDE 0.5 MG/2ML
0.5 INHALANT ORAL
Status: DISCONTINUED | OUTPATIENT
Start: 2021-09-07 | End: 2021-09-11 | Stop reason: HOSPADM

## 2021-09-07 RX ORDER — FUROSEMIDE 10 MG/ML
40 INJECTION INTRAMUSCULAR; INTRAVENOUS EVERY 12 HOURS
Status: DISCONTINUED | OUTPATIENT
Start: 2021-09-07 | End: 2021-09-08

## 2021-09-07 RX ORDER — DEXAMETHASONE SODIUM PHOSPHATE 10 MG/ML
10 INJECTION INTRAMUSCULAR; INTRAVENOUS DAILY
Status: DISCONTINUED | OUTPATIENT
Start: 2021-09-07 | End: 2021-09-11 | Stop reason: HOSPADM

## 2021-09-07 RX ADMIN — BUDESONIDE 0.5 MG: 0.5 INHALANT ORAL at 21:08

## 2021-09-07 RX ADMIN — FLUTICASONE PROPIONATE 2 SPRAY: 50 SPRAY, METERED NASAL at 09:28

## 2021-09-07 RX ADMIN — LEVOFLOXACIN 750 MG: 750 INJECTION, SOLUTION INTRAVENOUS at 11:30

## 2021-09-07 RX ADMIN — SODIUM CHLORIDE, PRESERVATIVE FREE 10 ML: 5 INJECTION INTRAVENOUS at 20:03

## 2021-09-07 RX ADMIN — SODIUM CHLORIDE, PRESERVATIVE FREE 10 ML: 5 INJECTION INTRAVENOUS at 09:28

## 2021-09-07 RX ADMIN — FUROSEMIDE 40 MG: 10 INJECTION, SOLUTION INTRAMUSCULAR; INTRAVENOUS at 09:26

## 2021-09-07 RX ADMIN — REMDESIVIR 100 MG: 100 INJECTION, POWDER, LYOPHILIZED, FOR SOLUTION INTRAVENOUS at 14:14

## 2021-09-07 RX ADMIN — ACETAMINOPHEN 650 MG: 325 TABLET ORAL at 18:42

## 2021-09-07 RX ADMIN — ARFORMOTEROL TARTRATE 15 MCG: 15 SOLUTION RESPIRATORY (INHALATION) at 21:08

## 2021-09-07 RX ADMIN — MINERAL OIL, PETROLATUM: 425; 568 OINTMENT OPHTHALMIC at 09:27

## 2021-09-07 RX ADMIN — ENOXAPARIN SODIUM 40 MG: 40 INJECTION SUBCUTANEOUS at 09:27

## 2021-09-07 RX ADMIN — DEXAMETHASONE SODIUM PHOSPHATE 10 MG: 10 INJECTION INTRAMUSCULAR; INTRAVENOUS at 09:27

## 2021-09-07 RX ADMIN — ACETAMINOPHEN 650 MG: 325 TABLET ORAL at 08:19

## 2021-09-07 RX ADMIN — FUROSEMIDE 40 MG: 10 INJECTION, SOLUTION INTRAMUSCULAR; INTRAVENOUS at 21:49

## 2021-09-07 NOTE — CONSULTS
Pulmonary / Critical Care Consult Note      Patient Name: Sonu Mensah  : 1980  MRN: [id number]  Primary Care Physician:  Mallorie Walker MD  Referring Physician: Devon Ramirez MD  Date of admission: 2021    Subjective   Subjective     Reason for Consult/ Chief Complaint:   Hypoxic respiratory failure d/t COVID-19 infection.    HPI:  Sonu Mensah is a 41 y.o. male without any significant past medical history was diagnosed with COVID-19 on 21 and was given steroids and Zpak as an outpatient.  Both his wife and children have COVID-10.  He is unvaccinated.  He presented to the ED with worsening shortness of breath.  His O2 sats at home were in the 70's.  He reports lost of taste and smell which has returned.  He complains of chills, non-productive cough, and dyspnea on exertion.  He denies any headaches, chest pain, hemoptysis, nausea, or vomiting.  Since admission he has been started on Remdesivir, Dexamethasone, Levaquin, and nebulizers.  Despite this treatment his oxygenation requirements continue to increase and now is on Airvo.  Due to the above our services was consulted for further evaluation and treatment.     Review of Systems  Constitutional symptoms: + fatigue, otherwise denied complaints   Ear, nose, throat: Denied complaints  Cardiovascular:  Denied complaints  Respiratory: + Nonproductive cough, + dyspnea, + GREGG, Denied complaints  Gastrointestinal: Denied complaints  Musculoskeletal: Denied complaints  Genitourinary: Denied complaints  Allergy / Immunology: Denied complaints  Hematologic: Denied complaints  Neurologic: Denied complaints  Skin: Denied complaints  Endocrine: Denied complaints  Psychiatric: Denied complaints    Personal History     Past Medical History:   Diagnosis Date   • COVID-19        Past Surgical History:   Procedure Laterality Date   • FRACTURE SURGERY     • HERNIA REPAIR     • LEG SURGERY         Family History:   No pulmonary comorbidities noted in  primary family members    Social History:  reports that he has never smoked. His smokeless tobacco use includes chew. He reports current alcohol use. He reports that he does not use drugs.    Home Medications:  Testosterone Cypionate, azithromycin, brompheniramine-pseudoephedrine-DM, predniSONE, and promethazine    Allergies:  Allergies   Allergen Reactions   • Penicillins Anaphylaxis       Objective    Objective     Vitals:   Temp:  [97.7 °F (36.5 °C)-98.2 °F (36.8 °C)] 97.9 °F (36.6 °C)  Heart Rate:  [55-79] 73  Resp:  [20-24] 20  BP: (102-128)/(65-84) 123/84  Flow (L/min):  [40] 40    Physical Exam:  Vital Signs Reviewed   General: WDWN male, Awake and Alert, NAD on Airvo    HEENT:  PERRL, EOMI.  OP, nares clear  Neck:  Supple, no JVD, no thyromegaly  Chest:  good aeration, bibasilar crackles, tympanic to percussion bilaterally, mild work of breathing noted on air Vo   CV: RRR, no MGR, pulses 2+, equal  Abd:  Soft, NT, ND, + BS, no HSM  EXT:  no clubbing, no cyanosis, no edema  Neuro:  A&Ox3, CN grossly intact, no focal deficits  Skin: No rashes or lesions noted       Result Review    Result Review:  I have personally reviewed the results from the time of this admission to 9/7/2021 17:20 EDT and agree with these findings:  [x]  Laboratory  [x]  Microbiology  [x]  Radiology  [x]  EKG/Telemetry   []  Cardiology/Vascular   []  Pathology  []  Old records  [x]  Other:  Most notable findings include: WBC 16.71, Cr 1.13, K+ 4.0, Mg+ 2.2, Procal 0.03, ALT 72, AST 23  CXR with bilateral infiltrates  Blood cultures negative    COVID LABS:  Results From Last 14 Days   Lab Units 09/07/21  0411 09/06/21  0619 09/05/21  0105   PROBNP pg/mL  --   --  200.7   CRP mg/dL  --   --  5.65*   D DIMER QUANT mg/L (FEU)  --   --  0.83*   FERRITIN ng/mL  --   --  242.20   LDH U/L  --   --  419*   PROCALCITONIN ng/mL 0.03 0.06 0.10  0.08   TROPONIN T ng/mL  --   --  <0.010       Assessment/Plan   Assessment / Plan     Active Hospital  Problems:  Active Hospital Problems    Diagnosis    • **Acute respiratory failure with hypoxia (CMS/HCC)    • Pneumonia due to COVID-19 virus    • Tobacco abuse    • COVID-19      Impression:  Acute hypoxic respiratory failure secondary to COVID-19 quiring air Vo  ARDS  COVID-19 pneumonia  Question community-acquired pneumonia from unspecified organism therapeutic drug monitoring  Hyponatremia  Elevated liver enzymes  Leukocytosis     Plan:  Continue on Airvo, wean as tolerated to keep sats >90%.  Continue Remdesivir to complete 5 day course.  Daily monitoring of renal panel and LFTs.  Continue to trend inflammatory markers.  Consider giving Tocilizumab tomorrow if no improvement.  Continue Decadron 10 mg IV daily to complete 10 day course.  Continue nebulizers and bronchopulmonary hygiene.  Encourage IS and flutter valve.  Encourage prone and laying side to side.  Up to chair when not prone.  Procal 0.06.  Pending sputum culture.  Continue Levaquin and can de-escalate based off cultures.  Will start Lasix 40 mg IV BID. Trend renal panel and electrolytes.     DVT prophylaxis:  Medical and mechanical DVT prophylaxis orders are present.     Code Status and Medical Interventions:   Ordered at: 09/05/21 0259     Level Of Support Discussed With:    Patient     Code Status:    CPR     Medical Interventions (Level of Support Prior to Arrest):    Full        Labs, microbiology, radiology, medications, and provider notes personally reviewed.  Discussed with primary service and bedside RN.     Thank for this consult and allowing me to participate in the care of Mr. Mensah.    The patient is critically ill in the ICU with acute hypoxemic respiratory failure requiring airvo, COVID-19 pneumonia, ARDS. Multidisciplinary bedside critical care rounds were performed with nursing staff, respiratory therapy, pharmacy, nutritional services, social work. I have personally reviewed the chart, labs and any pertinent imaging available.  I  have spent 31 requiring air Vo minutes of critical care time, excluding procedures, in the care of this patient.    Electronically signed by REYNOLD Claros, 09/07/21, 3:44 PM EDT.  Electronically signed by Malcolm Michael MD, 09/07/21, 5:23 PM EDT.

## 2021-09-07 NOTE — PROGRESS NOTES
[facility]   Hospitalist Progress Note  Date: 2021  Patient Name: Sonu Mensah  : 1980  MRN: [id number]  Date of admission: 2021      Subjective   Subjective     Chief Complaint: SOA    Summary:   Sonu Mensah is a 41 y.o. old male patient with no significant past medical history he was diagnosed with Covid 19 on 8/3/21.  He reports has been having symptoms for the previous 8 days.  Symptoms of nonproductive cough, diarrhea, subjective fevers. On arrival to the emergency department the patient was tachypneic, saturating 81% on room air.  Blood work showed an elevated white blood cell count at 17,200.  Chest x-ray showed bilateral infiltrates.  The patient was started on nonrebreather, and subsequently transition to air Vo.  Currently getting Decadron and remdesivir for COVID-19.  Treating community-acquired pneumonia with Levaquin.     Interval Followup:   Patient states he has been doing his best lying on his sides.  States he got decent sleep overnight.  On my exam patient with increased work of breathing.  Had recently use the restroom, prolonged recovery phase.  As patient is still on air Vo will consult pulmonologist today.    Review of Systems  10 out of 14 systems reviewed and all are negative except stated above    Objective   Objective     Vitals:   Temp:  [97.7 °F (36.5 °C)-98.6 °F (37 °C)] 97.9 °F (36.6 °C)  Heart Rate:  [58-79] 78  Resp:  [20-24] 20  BP: (111-128)/(65-79) 128/65  Flow (L/min):  [40] 40  Physical Exam    Constitutional: Awake, alert, increased work of breathing   Eyes: Pupils equal, sclerae anicteric, no conjunctival injection   HENT: NCAT, mucous membranes moist   Neck: Supple, no thyromegaly, no lymphadenopathy, trachea midline   Respiratory: Bibasilar crackles on auscultation.  Poor air movement overall   Cardiovascular: RRR, no murmurs, rubs, or gallops, palpable pedal pulses bilaterally   Gastrointestinal: Positive bowel sounds, soft, nontender,  nondistended   Musculoskeletal: No bilateral ankle edema, no clubbing or cyanosis to extremities   Psychiatric: Appropriate affect, cooperative   Neurologic: Oriented x 3, strength symmetric in all extremities, Cranial Nerves grossly intact to confrontation, speech clear   Skin: No rashes     Result Review    Result Review:  I have personally reviewed the results from the time of this admission to 9/7/2021 10:22 EDT and agree with these findings:  Reviewed imaging and labs    Assessment/Plan   Assessment / Plan     Assessment/Plan:  Acute hypoxemic respiratory failure  COVID-19 pneumonia  Community-acquired pneumonia  Tobacco abuse    Plan:  Patient remains admitted to hospital for further care management  Consulting pulmonologist, appreciate assistance  Continue air Vo for oxygen saturations less than 90% wean as able  Continue Decadron  Continue remdesivir  Continue Levaquin  Starting patient on scheduled breathing treatments  Continue scheduled diuretics    Discussed plan with RN, pulmonologist    DVT prophylaxis:  Enoxaparin  CODE STATUS:   Level Of Support Discussed With: Patient  Code Status: CPR  Medical Interventions (Level of Support Prior to Arrest): Full    Electronically signed by Devon Ramirez MD, 09/07/21, 10:19 AM EDT.

## 2021-09-07 NOTE — PAYOR COMM NOTE
"Sonu Corral (41 y.o. Male)     Date of Birth Social Security Number Address Home Phone MRN    1980  [id number]   [facility] [phone] [id number]    Church Marital Status          Unknown        Admission Date Admission Type Admitting Provider Attending Provider Department, Room/Bed    9/5/21 Emergency Devon Ramirez MD Oberst, Eric, MD [facility] 4TH FLOOR MEDICAL TELEMETRY UNIT, 422/1    Discharge Date Discharge Disposition Discharge Destination                       Attending Provider: Devon Ramirez MD    Allergies: Penicillins    Isolation: Contact Air   Infection: COVID (confirmed) (08/31/21)   Code Status: CPR    Ht: 180.3 cm (71\")   Wt: 90.9 kg (200 lb 6.4 oz)    Admission Cmt: None   Principal Problem: Acute respiratory failure with hypoxia (CMS/HCC) [J96.01]                 Active Insurance as of 9/5/2021     Primary Coverage     Payor Plan Insurance Group Employer/Plan Group    [facility] [id number]     Payor Plan Address Payor Plan Phone Number Payor Plan Fax Number Effective Dates    [address] [phone]  3/1/2021 - None Entered    [facility] [id number]       Subscriber Name Subscriber Birth Date Member ID       RAMESH CORRAL [birth year]/1983 [id number]                 Emergency Contacts      (Rel.) Home Phone Work Phone Mobile Phone    RAMESH CORRAL (Spouse) [phone] -- [phone]      ++++++++++++++++++[phone]+++++++++++++++++++++         Emergency Department Notes      Good Pang MD at 09/05/21 0142          Time: 1:42 AM EDT  Arrived by: private vehicle  Chief Complaint: shortness of breath  History provided by: patient  History is limited by: N/A    History of Present Illness:  Patient is a 41 y.o. year old male that presents to the emergency department with shortness of breath. Pt was seen on 8/31 at Urgent Care and COVID-19 swab came back positive. Pt breathing got worse yesterday. Pt symptoms started prior to " being tested. Pt notes some mild diarrhea. Pt is taking Z-pac.         Shortness of Breath  Severity:  Moderate  Onset quality:  Gradual  Duration:  2 days  Timing:  Constant  Progression:  Improving  Chronicity:  New  Relieved by:  Oxygen  Worsened by:  Nothing  Associated symptoms: no chest pain, no cough, no fever, no neck pain, no rash and no vomiting            Similar Symptoms Previously: yes  Recently seen: yes      Patient Care Team  Primary Care Provider: Mallorie Walker MD      Past Medical History:     Allergies   Allergen Reactions   • Penicillins Anaphylaxis     Past Medical History:   Diagnosis Date   • COVID-19      Past Surgical History:   Procedure Laterality Date   • FRACTURE SURGERY     • HERNIA REPAIR     • LEG SURGERY       History reviewed. No pertinent family history.    Home Medications:  Prior to Admission medications    Medication Sig Start Date End Date Taking? Authorizing Provider   azithromycin (Zithromax Z-Robert) 250 MG tablet Take 2 tablets by mouth on day 1, then 1 tablet daily on days 2-5 8/31/21   Amos Murphy MD   brompheniramine-pseudoephedrine-DM 30-2-10 MG/5ML syrup Take 10 mL by mouth 3 (Three) Times a Day As Needed for Congestion. 8/31/21   Amos Murphy MD   predniSONE (DELTASONE) 10 MG tablet TAKE 6,5,4,3,2,1 TABS ON CONSECUTIVE DAYS 8/31/21   Amos Murphy MD   promethazine (PHENERGAN) 25 MG tablet Take 1 tablet by mouth Every 6 (Six) Hours As Needed for Nausea or Vomiting. 8/31/21   Amos Murphy MD   Testosterone Cypionate (DEPOTESTOTERONE CYPIONATE) 200 MG/ML injection INJECT 0.5 MLS INTO THE MUSCLE EVERY WEEK 8/15/21   Emergency, Nurse [facility], RN        Social History:   PT  reports that he has never smoked. His smokeless tobacco use includes chew. He reports current alcohol use. He reports that he does not use drugs.    Record Review:  I have reviewed the patient's records in [facility].     Review of Systems  Review of Systems   Constitutional: Negative for  "chills and fever.   HENT: Negative for nosebleeds.    Eyes: Negative for redness.   Respiratory: Positive for shortness of breath. Negative for cough.    Cardiovascular: Negative for chest pain.   Gastrointestinal: Positive for diarrhea. Negative for vomiting.   Genitourinary: Negative for dysuria and frequency.   Musculoskeletal: Negative for back pain and neck pain.   Skin: Negative for rash.   Neurological: Negative for seizures.        Physical Exam  /79   Pulse 66   Temp 97.7 °F (36.5 °C) (Oral)   Resp 24   Ht 180.3 cm (71\")   SpO2 90%   BMI 29.85 kg/m²     Physical Exam  Vitals and nursing note reviewed.   Constitutional:       General: He is not in acute distress.     Appearance: Normal appearance. He is not toxic-appearing.   HENT:      Head: Normocephalic and atraumatic.      Nose: Nose normal.      Mouth/Throat:      Mouth: Mucous membranes are moist.   Eyes:      General: Lids are normal. No scleral icterus.     Conjunctiva/sclera: Conjunctivae normal.   Cardiovascular:      Rate and Rhythm: Normal rate and regular rhythm.      Pulses: Normal pulses.      Heart sounds: Normal heart sounds. No murmur heard.     Pulmonary:      Effort: Tachypnea and respiratory distress (mild) present.      Breath sounds: Examination of the right-upper field reveals rhonchi. Examination of the left-upper field reveals rhonchi. Examination of the right-middle field reveals rhonchi. Examination of the left-middle field reveals rhonchi. Examination of the right-lower field reveals rhonchi. Examination of the left-lower field reveals rhonchi. Rhonchi present.   Chest:      Chest wall: No tenderness.   Abdominal:      Palpations: Abdomen is soft.      Tenderness: There is no abdominal tenderness. There is no guarding or rebound.   Musculoskeletal:         General: No tenderness. Normal range of motion.      Cervical back: Normal range of motion and neck supple. No tenderness.      Right lower leg: No edema.      Left " "lower leg: No edema.   Skin:     General: Skin is warm and dry.      Findings: No rash.   Neurological:      General: No focal deficit present.      Mental Status: He is alert and oriented to person, place, and time.      Sensory: Sensation is intact. No sensory deficit.      Motor: Motor function is intact. No weakness.   Psychiatric:         Behavior: Behavior normal.                  ED Course  /79   Pulse 66   Temp 97.7 °F (36.5 °C) (Oral)   Resp 24   Ht 180.3 cm (71\")   SpO2 90%   BMI 29.85 kg/m²   Results for orders placed or performed during the hospital encounter of 09/05/21   Comprehensive Metabolic Panel    Specimen: Blood   Result Value Ref Range    Glucose 153 (H) 65 - 99 mg/dL    BUN 16 6 - 20 mg/dL    Creatinine 0.92 0.76 - 1.27 mg/dL    Sodium 136 136 - 145 mmol/L    Potassium 4.3 3.5 - 5.2 mmol/L    Chloride 101 98 - 107 mmol/L    CO2 22.7 22.0 - 29.0 mmol/L    Calcium 9.1 8.6 - 10.5 mg/dL    Total Protein 7.2 6.0 - 8.5 g/dL    Albumin 3.80 3.50 - 5.20 g/dL    ALT (SGPT) 88 (H) 1 - 41 U/L    AST (SGOT) 46 (H) 1 - 40 U/L    Alkaline Phosphatase 53 39 - 117 U/L    Total Bilirubin 0.3 0.0 - 1.2 mg/dL    eGFR Non African Amer 92 >60 mL/min/1.73    Globulin 3.4 gm/dL    A/G Ratio 1.1 g/dL    BUN/Creatinine Ratio 17.4 7.0 - 25.0    Anion Gap 12.3 5.0 - 15.0 mmol/L   BNP    Specimen: Blood   Result Value Ref Range    proBNP 200.7 0.0 - 450.0 pg/mL   Troponin    Specimen: Blood   Result Value Ref Range    Troponin T <0.010 0.000 - 0.030 ng/mL   CBC Auto Differential    Specimen: Blood   Result Value Ref Range    WBC 17.20 (H) 3.40 - 10.80 10*3/mm3    RBC 5.33 4.14 - 5.80 10*6/mm3    Hemoglobin 16.2 13.0 - 17.7 g/dL    Hematocrit 47.6 37.5 - 51.0 %    MCV 89.3 79.0 - 97.0 fL    MCH 30.4 26.6 - 33.0 pg    MCHC 34.0 31.5 - 35.7 g/dL    RDW 13.1 12.3 - 15.4 %    RDW-SD 43.0 37.0 - 54.0 fl    MPV 11.0 6.0 - 12.0 fL    Platelets 313 140 - 450 10*3/mm3    Neutrophil % 86.9 (H) 42.7 - 76.0 %    " Lymphocyte % 3.3 (L) 19.6 - 45.3 %    Monocyte % 4.5 (L) 5.0 - 12.0 %    Eosinophil % 0.0 (L) 0.3 - 6.2 %    Basophil % 0.4 0.0 - 1.5 %    Immature Grans % 4.9 (H) 0.0 - 0.5 %    Neutrophils, Absolute 14.94 (H) 1.70 - 7.00 10*3/mm3    Lymphocytes, Absolute 0.57 (L) 0.70 - 3.10 10*3/mm3    Monocytes, Absolute 0.78 0.10 - 0.90 10*3/mm3    Eosinophils, Absolute 0.00 0.00 - 0.40 10*3/mm3    Basophils, Absolute 0.07 0.00 - 0.20 10*3/mm3    Immature Grans, Absolute 0.84 (H) 0.00 - 0.05 10*3/mm3    nRBC 0.2 0.0 - 0.2 /100 WBC   Ferritin    Specimen: Blood   Result Value Ref Range    Ferritin 242.20 30.00 - 400.00 ng/mL   D-dimer, Quantitative    Specimen: Blood   Result Value Ref Range    D-Dimer, Quantitative 0.83 (H) 0.00 - 0.59 mg/L (FEU)   Lactate Dehydrogenase    Specimen: Blood   Result Value Ref Range     (H) 135 - 225 U/L   Procalcitonin    Specimen: Blood   Result Value Ref Range    Procalcitonin 0.08 0.00 - 0.25 ng/mL   C-reactive Protein    Specimen: Blood   Result Value Ref Range    C-Reactive Protein 5.65 (H) 0.00 - 0.50 mg/dL   Hepatic Function Panel    Specimen: Blood   Result Value Ref Range    Total Protein 7.3 6.0 - 8.5 g/dL    Albumin 3.90 3.50 - 5.20 g/dL    ALT (SGPT) 91 (H) 1 - 41 U/L    AST (SGOT) 46 (H) 1 - 40 U/L    Alkaline Phosphatase 55 39 - 117 U/L    Total Bilirubin 0.3 0.0 - 1.2 mg/dL    Bilirubin, Direct <0.2 0.0 - 0.3 mg/dL    Bilirubin, Indirect     Creatinine, Serum    Specimen: Blood   Result Value Ref Range    Creatinine 0.96 0.76 - 1.27 mg/dL    eGFR Non African Amer 87 >60 mL/min/1.73   ECG 12 Lead   Result Value Ref Range    QT Interval 411 ms   Green Top (Gel)   Result Value Ref Range    Extra Tube Hold for add-ons.    Lavender Top   Result Value Ref Range    Extra Tube hold for add-on    Gold Top - SST   Result Value Ref Range    Extra Tube Hold for add-ons.    Light Blue Top   Result Value Ref Range    Extra Tube hold for add-on      Medications   sodium chloride 0.9 %  flush 10 mL (has no administration in time range)   dexamethasone (DECADRON) injection 6 mg (has no administration in time range)   remdesivir 200 mg in sodium chloride 0.9 % 290 mL IVPB (powder vial) (0 mg Intravenous Stopped 9/5/21 0622)     Followed by   remdesivir 100 mg in 270 mL NS (has no administration in time range)   Pharmacy Consult - Remdesivir (has no administration in time range)   artificial tears ophthalmic ointment (has no administration in time range)   artificial tears ophthalmic ointment (has no administration in time range)   dexamethasone (DECADRON) injection 10 mg (10 mg Intravenous Given 9/5/21 0256)     XR Chest 1 View    Result Date: 9/5/2021  Narrative: PROCEDURE: XR CHEST 1 VW  COMPARISON: None.  INDICATIONS: SHORTNESS OF BREATH.  FINDINGS: A single AP upright portable view of the chest is provided for review.  Bilateral infiltrates are seen.  The findings may represent infectious multifocal pneumonia.  No cardiomediastinal enlargement.  No pneumothorax.  No pneumomediastinum.  No pleural effusion.  There is slight pulmonary hypoinflation.  External artifacts obscure detail. Chronic calcified granulomatous disease involves the chest.   CONCLUSION: Bilateral infiltrates are seen.  The findings may represent infectious multifocal pneumonia.       ABBY STEPHENS JR, MD       Electronically Signed and Approved By: ABBY STEPHENS JR, MD on 9/05/2021 at 1:46               Procedures/EKGs:  Procedures          Medical Decision Making:                     [facility]     Patient with COVID-19 pneumonia.  Chest x-ray shows diffuse bilateral infiltrates consistent with multifocal pneumonia.  Patient hypoxic on arrival to the emergency department requiring significant supplemental oxygen to maintain oxygen saturation.  Ultimately patient placed on Vapotherm therapy at 75% with 40 L.  He is comfortable speaking in full sentences after supplemental oxygen.  Patient discussed with hospitalist for admission.   The patient's airway is intact, vital signs, and respiratory status are safe for admission at this time.    Total Critical Care time of 35  minutes. Total critical care time documented does not include time spent on separately billed procedures for services of nurses or physician assistants. I personally saw and examined the patient. I have reviewed all diagnostic interpretations and treatment plans as written. I was present for the key portions of any procedures performed and the inclusive time noted in any critical care statement. Critical care time includes patient management by me, time spent at the patients bedside, time to review lab and imaging results, discussing patient care, documentation in the medical record, and time spent with family or caregiver.      Final diagnoses:   Pneumonia due to COVID-19 virus   Acute respiratory failure with hypoxia (CMS/HCC)        Disposition:  ED Disposition     ED Disposition Condition Comment    Decision to Admit  Level of Care: Critical Care [6]   Diagnosis: Pneumonia due to COVID-19 virus [[id number]]   Admitting Physician: LAURO NAGY [[id number]]   Attending Physician: LAURO NAGY [[id number]]   Isolate for COVID?: Yes [1]   Certification: I Certify That Inpatient Hospital Services Are Medically Necessary For Greater Than 2 Midnights            Documentation assistance provided by Crystal Burtno acting as scribe for Good Pang MD. Information recorded by the scribe was done at my direction and has been verified and validated by me.        Crystal Burton  09/05/21 0214       Good Pang MD  09/05/21 0716      Electronically signed by Good Pang MD at 09/05/21 0716     Naldo Dennis, RN at 09/05/21 0910        Pt. Has no needs at this time. Pt. Updated on wait      Naldo Dennis, RN  09/05/21 0910      Electronically signed by Naldo Dennis, RN at 09/05/21 0910       Lauro Nagy MD   Physician   Hospitalist    H&P       Addendum   Date of Service:  21   Creation Time:  21            Expand AllCollapse All      Show:Clear all  [x]Manual[x]Template[]Copied    Added by:  [x]Lauro Nagy MD    []Kayy for details  History and Physical   Sonu Mensah , :  1980, MRN:  [id number]     Chief complaint: Low oxygen saturation     Assessment/Plan        Acute respiratory failure with hypoxia (CMS/HCC)    Pneumonia due to COVID-19 virus    Tobacco abuse        · Acute respiratory failure with hypoxia: The patient presents to the emergency department due to low oxygen saturations at home over the past 2 days.  He reports on the day of presentation his saturations were in the 70s.  He was initially on nonrebreather, was transitioned to air Vo.  He is on 40 L at 75% and saturating 91%.  Secondary to Covid.  Wean oxygen as tolerated.  Inpatient proning.     · Pneumonia due to COVID-19: The patient tested positive for COVID-19 on 2021.  Reports symptoms started on 2021.  He has been monitoring his oxygen saturations at home, have been maintaining in the 70s today.  Started on Decadron, remdesivir.  Order COVID-19 labs.  Reevaluate in the morning for possible escalation of medications.     · Tobacco abuse: The patient uses smokeless tobacco.  Counseled on cessation.     · Clinical course will dictate further medical management.     DVT Prophylaxis: Lovenox  Code Status: Full     Reviewed patients labs and imaging, and discussed with patient and nurse at bedside.     Patient risk level:  Patient comorbidities and risk factors make patient a poor candidate for outpatient management due to concerns of deterioration.     Total time spent on admission of this critically ill patient with Covid pneumonia, acute respiratory failure with hypoxia requiring air Vo: 70 minutes     History of Present illness      Sonu Mensah is a 41 y.o. old male patient who presents with low oxygen  saturations, shortness of breath.  The patient has no known medical history.     The patient reports he was diagnosed with COVID-19 on 8/31/2021.  He reports has been having symptoms for the past 8 days.  He reports his fevers have resolved.  He reports initial loss of taste and smell, now improving.  He reports cough that is nonproductive.  He reports diarrhea.  He denies nausea or vomiting.  He reports difficulty taking deep breaths.     ED course: On arrival to the emergency department the patient was tachypneic, saturating 81% on room air.  Blood work showed an elevated white blood cell count at 17,200.  Chest x-ray showed bilateral infiltrates.  The patient was started on nonrebreather, and subsequently transition to air Vo.  The hospital service was asked to admit the patient for further evaluation and management.     Old records were reviewed which revealed no previous hospitalizations.     Review of Systems      General:  Denies fevers, chills, weight loss/gain or night sweats.  HEENT: Denies dysphagia, hearing changes, rhinorrhea, visual changes or nasal congestion.  Respiratory: Reports cough, dyspnea.  Denies sputum changes, wheezing or hemoptysis.  Denies pleuritic chest pain.  Cardiovascular: Denies chest pain, palpitations, dyspnea on exertion or orthopnea. Denies chest pressure. Denies lower extremity edema.  Gastrointestinal: Denies abdominal pain, nausea, vomiting.  Reports mild diarrhea.   Genitourinary: Denies dysuria, frequency or hesitancy. Denies incontinence, urgency or hematuria.  Musculoskeletal: Denies joint effusions, joint tenderness, joint stiffness or myalgias.  Endocrine: Denies heat or cold intolerance.  Reports fatigue.    Hematologic: Denies bleeding, bruising or swollen glands.  Psychiatric: Denies anxiety or depression.  Neurologic: Denies confusion, headaches, numbness or visual changes.  Skin: Denies rash, edema or open wounds.     Past Medical/Surgical/Social/Family  History/Allergies      Medical History   Past Medical History:   Diagnosis Date   • COVID-19              Surgical History         Past Surgical History:   Procedure Laterality Date   • FRACTURE SURGERY       • HERNIA REPAIR       • LEG SURGERY                Social History   Social History            Socioeconomic History   • Marital status:        Spouse name: Not on file   • Number of children: Not on file   • Years of education: Not on file   • Highest education level: Not on file   Tobacco Use   • Smoking status: Never Smoker   • Smokeless tobacco: Current User       Types: Chew   Vaping Use   • Vaping Use: Never used   Substance and Sexual Activity   • Alcohol use: Yes       Comment: occ   • Drug use: Never   • Sexual activity: Defer            History reviewed. No pertinent family history.          Allergies   Allergen Reactions   • Penicillins Anaphylaxis         The above past medical history, past surgical history, social history, family history allergies and home medications were personally reviewed by me today and corrected as needed  Home Medications     Prescriptions Prior to Admission   (Not in a hospital admission)      Physical Exam   Vital signs:  Temperature Blood Pressure Heart Rate Resp Rate SpO2   Temp: 97.7 °F (36.5 °C) BP: 137/72 Heart Rate: 75 Resp: 24 SpO2: 93 %      HEENT: Head is atraumatic, extraocular movements are intact. Oropharynx is normal.  Neck: Supple, no carotid bruits or cervical lymphadenopathy.  Cardiovascular: Regular rate and rhythm. No murmurs, gallops or rubs. No peripheral edema. No JVD.  Lungs: Bibasilar crackles to auscultation.  Abdomen: Normal bowel sounds in all 4 quadrants. No rebound, guarding or tenderness.    Chest: Normal inspection. Equal rise and fall. No retractions noted.  Constitutional: Well-nourished, well-developed.  Resting comfortably on air Vo.  Lymphatic: No cervical lymphadenopathy.  Extremities: 5/5 upper and lower extremity strength.  Normal range of motion.  No clubbing, cyanosis or edema.  Neurological: Alert and oriented x3. No numbness or tingling.  Psychological: Normal mood and affect. Well kempt. Normal eye contact.  Skin: No rash, cellulitis, swelling or bruising.     Labs           Results from last 7 days   Lab Units 21   WBC 10*3/mm3 17.20*   HEMOGLOBIN g/dL 16.2   HEMATOCRIT % 47.6   PLATELETS 10*3/mm3 313           Results from last 7 days   Lab Units 21  010   SODIUM mmol/L 136   POTASSIUM mmol/L 4.3   CHLORIDE mmol/L 101   CO2 mmol/L 22.7   BUN mg/dL 16   CREATININE mg/dL 0.92   CALCIUM mg/dL 9.1           Results from last 7 days   Lab Units 21   ALT (SGPT) U/L 88*   AST (SGOT) U/L 46*   ALK PHOS U/L 53   BILIRUBIN mg/dL 0.3                     Invalid input(s): CPK, MASSCKMB         I reviewed patient's labs from today and also previous labs while reviewing old records   Imaging and Other procedures      Chest X ray: My independent assessment showed bibasilar infiltrates.  EKG: Pending     I reviewed patient's imaging and other testing from today and also in the past including but not limited to imaging, previous imaging, EKG, previous EKGs, echocardiogram, and other procedures if any and previously done by reviewing old records  Current Medications   Scheduled Meds:dexamethasone, 10 mg, Intravenous, Once        Continuous Infusions:      @[facility]@  21  02:55 EDT         Revision History                          Routing History                Lauro Nagy MD   Physician   Hospitalist   H&P       Addendum   Date of Service:  21   Creation Time:  21            Expand AllCollapse All      Show:Clear all  [x]Manual[x]Template[]Copied    Added by:  [x]Lauro Nagy MD    []Kayy for details  History and Physical   Sonu Mensah , :  1980, MRN:  [id number]     Chief complaint: Low oxygen saturation     Assessment/Plan        Acute  respiratory failure with hypoxia (CMS/HCC)    Pneumonia due to COVID-19 virus    Tobacco abuse        · Acute respiratory failure with hypoxia: The patient presents to the emergency department due to low oxygen saturations at home over the past 2 days.  He reports on the day of presentation his saturations were in the 70s.  He was initially on nonrebreather, was transitioned to air Vo.  He is on 40 L at 75% and saturating 91%.  Secondary to Covid.  Wean oxygen as tolerated.  Inpatient proning.     · Pneumonia due to COVID-19: The patient tested positive for COVID-19 on 8/31/2021.  Reports symptoms started on 8/29/2021.  He has been monitoring his oxygen saturations at home, have been maintaining in the 70s today.  Started on Decadron, remdesivir.  Order COVID-19 labs.  Reevaluate in the morning for possible escalation of medications.     · Tobacco abuse: The patient uses smokeless tobacco.  Counseled on cessation.     · Clinical course will dictate further medical management.     DVT Prophylaxis: Lovenox  Code Status: Full     Reviewed patients labs and imaging, and discussed with patient and nurse at bedside.     Patient risk level:  Patient comorbidities and risk factors make patient a poor candidate for outpatient management due to concerns of deterioration.     Total time spent on admission of this critically ill patient with Covid pneumonia, acute respiratory failure with hypoxia requiring air Vo: 70 minutes     History of Present illness      Sonu Mensah is a 41 y.o. old male patient who presents with low oxygen saturations, shortness of breath.  The patient has no known medical history.     The patient reports he was diagnosed with COVID-19 on 8/31/2021.  He reports has been having symptoms for the past 8 days.  He reports his fevers have resolved.  He reports initial loss of taste and smell, now improving.  He reports cough that is nonproductive.  He reports diarrhea.  He denies nausea or vomiting.  He  reports difficulty taking deep breaths.     ED course: On arrival to the emergency department the patient was tachypneic, saturating 81% on room air.  Blood work showed an elevated white blood cell count at 17,200.  Chest x-ray showed bilateral infiltrates.  The patient was started on nonrebreather, and subsequently transition to air Vo.  The hospital service was asked to admit the patient for further evaluation and management.     Old records were reviewed which revealed no previous hospitalizations.     Review of Systems      General:  Denies fevers, chills, weight loss/gain or night sweats.  HEENT: Denies dysphagia, hearing changes, rhinorrhea, visual changes or nasal congestion.  Respiratory: Reports cough, dyspnea.  Denies sputum changes, wheezing or hemoptysis.  Denies pleuritic chest pain.  Cardiovascular: Denies chest pain, palpitations, dyspnea on exertion or orthopnea. Denies chest pressure. Denies lower extremity edema.  Gastrointestinal: Denies abdominal pain, nausea, vomiting.  Reports mild diarrhea.   Genitourinary: Denies dysuria, frequency or hesitancy. Denies incontinence, urgency or hematuria.  Musculoskeletal: Denies joint effusions, joint tenderness, joint stiffness or myalgias.  Endocrine: Denies heat or cold intolerance.  Reports fatigue.    Hematologic: Denies bleeding, bruising or swollen glands.  Psychiatric: Denies anxiety or depression.  Neurologic: Denies confusion, headaches, numbness or visual changes.  Skin: Denies rash, edema or open wounds.     Past Medical/Surgical/Social/Family History/Allergies      Medical History        Past Medical History:   Diagnosis Date   • COVID-19              Surgical History         Past Surgical History:   Procedure Laterality Date   • FRACTURE SURGERY       • HERNIA REPAIR       • LEG SURGERY                Social History   Social History      Socioeconomic History   • Marital status:        Spouse name: Not on file   • Number of children: Not  on file   • Years of education: Not on file   • Highest education level: Not on file   Tobacco Use   • Smoking status: Never Smoker   • Smokeless tobacco: Current User       Types: Chew   Vaping Use   • Vaping Use: Never used   Substance and Sexual Activity   • Alcohol use: Yes       Comment: occ   • Drug use: Never   • Sexual activity: Defer            History reviewed. No pertinent family history.          Allergies   Allergen Reactions   • Penicillins Anaphylaxis         The above past medical history, past surgical history, social history, family history allergies and home medications were personally reviewed by me today and corrected as needed  Home Medications     Prescriptions Prior to Admission   (Not in a hospital admission)      Physical Exam   Vital signs:  Temperature Blood Pressure Heart Rate Resp Rate SpO2   Temp: 97.7 °F (36.5 °C) BP: 137/72 Heart Rate: 75 Resp: 24 SpO2: 93 %      HEENT: Head is atraumatic, extraocular movements are intact. Oropharynx is normal.  Neck: Supple, no carotid bruits or cervical lymphadenopathy.  Cardiovascular: Regular rate and rhythm. No murmurs, gallops or rubs. No peripheral edema. No JVD.  Lungs: Bibasilar crackles to auscultation.  Abdomen: Normal bowel sounds in all 4 quadrants. No rebound, guarding or tenderness.    Chest: Normal inspection. Equal rise and fall. No retractions noted.  Constitutional: Well-nourished, well-developed.  Resting comfortably on air Vo.  Lymphatic: No cervical lymphadenopathy.  Extremities: 5/5 upper and lower extremity strength. Normal range of motion.  No clubbing, cyanosis or edema.  Neurological: Alert and oriented x3. No numbness or tingling.  Psychological: Normal mood and affect. Well kempt. Normal eye contact.  Skin: No rash, cellulitis, swelling or bruising.     Labs           Results from last 7 days   Lab Units 09/05/21  0105   WBC 10*3/mm3 17.20*   HEMOGLOBIN g/dL 16.2   HEMATOCRIT % 47.6   PLATELETS 10*3/mm3 313            Results from last 7 days   Lab Units 09/05/21  0105   SODIUM mmol/L 136   POTASSIUM mmol/L 4.3   CHLORIDE mmol/L 101   CO2 mmol/L 22.7   BUN mg/dL 16   CREATININE mg/dL 0.92   CALCIUM mg/dL 9.1           Results from last 7 days   Lab Units 09/05/21  0105   ALT (SGPT) U/L 88*   AST (SGOT) U/L 46*   ALK PHOS U/L 53   BILIRUBIN mg/dL 0.3                     Invalid input(s): CPK, MASSCKMB         I reviewed patient's labs from today and also previous labs while reviewing old records   Imaging and Other procedures      Chest X ray: My independent assessment showed bibasilar infiltrates.  EKG: Pending     I reviewed patient's imaging and other testing from today and also in the past including but not limited to imaging, previous imaging, EKG, previous EKGs, echocardiogram, and other procedures if any and previously done by reviewing old records  Current Medications   Scheduled Meds:dexamethasone, 10 mg, Intravenous, Once        Continuous Infusions:      @[facility]@  09/05/21  02:55 EDT         Revision History                          Routing History                Rosalva Mortensen RN   Registered Nurse      Plan of Care   Signed   Date of Service:  09/06/21 0408   Creation Time:  09/06/21 0408            Signed             Show:Clear all  []Manual[x]Template[]Copied    Added by:  [x]Rosalva Mortensen RN    []Kayy for details  Goal Outcome Evaluation:  Plan of Care Reviewed With: patient  Progress: improving  Outcome Summary: Pt has been improving on the airvo. He is now weaned down to 40 L and 60 %. Still unable to cough anything up. Pt able to rest in between care, very pleasant pt. SHADY Doran Jay, MD   Physician   Hospitalist   Progress Notes       Signed   Date of Service:  09/06/21 1516   Creation Time:  09/06/21 1516            Signed        Expand AllCollapse All      Show:Clear all  [x]Manual[x]Template[x]Copied    Added by:  [x]Amilcar Reed MD    []Kayy for details   [facility]   Hospitalist Progress Note  Date: 2021  Patient Name: Sonu Mensah  : 1980  MRN: [id number]  Date of admission: 2021           Subjective []Expand by Default     Subjective      Chief Complaint: SOA     Summary:   Sonu Mensah is a 41 y.o. old male patient with no significant past medical history he was diagnosed with Covid 19 on .  He reports has been having symptoms for the past 8 days.  He had fevers but they resolved..  He reports initial loss of taste and smell, now improving.  He reports cough that is nonproductive.  He reports diarrhea.  He denies nausea or vomiting.  He reports difficulty taking deep breaths.  On arrival to the emergency department the patient was tachypneic, saturating 81% on room air.  Blood work showed an elevated white blood cell count at 17,200.  Chest x-ray showed bilateral infiltrates.  The patient was started on nonrebreather, and subsequently transition to air Vo.    Currently getting Decadron and remdesivir for COVID-19.  Treating community-acquired pneumonia with Levaquin.     Interval Followup:   --Patient seems to be doing better today with decreasing air Vo requirements  --Continue Decadron and remdesivir  --Continue Levaquin for 5 days to treat community-acquired pneumonia  --Patient states that he feels better no issues     Review of Systems  10 out of 14 systems reviewed and all are negative except stated above           Objective      Objective      Vitals:   Temp:  [97.9 °F (36.6 °C)-98.6 °F (37 °C)] 98.6 °F (37 °C)  Heart Rate:  [56-65] 65  Resp:  [18-20] 20  BP: (121-135)/(72-88) 123/79  Flow (L/min):  [40-45] 40  Physical Exam                         Constitutional: Awake, alert, no acute distress              Eyes: Pupils equal, sclerae anicteric, no conjunctival injection              HENT: NCAT, mucous membranes moist              Neck: Supple, no thyromegaly, no lymphadenopathy, trachea midline              Respiratory: Bibasilar  crackles on auscultation.  No respiratory distress              Cardiovascular: RRR, no murmurs, rubs, or gallops, palpable pedal pulses bilaterally              Gastrointestinal: Positive bowel sounds, soft, nontender, nondistended              Musculoskeletal: No bilateral ankle edema, no clubbing or cyanosis to extremities              Psychiatric: Appropriate affect, cooperative              Neurologic: Oriented x 3, strength symmetric in all extremities, Cranial Nerves grossly intact to confrontation, speech clear              Skin: No rashes            Result Review       Result Review:  I have personally reviewed the results from the time of this admission to 2021 15:16 EDT and agree with these findings:  Reviewed imaging and labs           Assessment/Plan      Assessment / Plan      Assessment/Plan:  Acute hypoxemic respiratory failure  COVID-19 pneumonia  Multifocal pneumonia  Tobacco abuse  Leukocytosis most likely driven by steroids at this time     Plan:  --Admit to COVID-19 unit  --Continue air Vo for oxygen saturations less than 90%  --Continue Decadron  --Continue remdesivir  --Continue Levaquin  --Follow-up blood culture  --Follow-up respiratory culture  --Strep and and Legionella urine antigen        Discussed plan with RN.     DVT prophylaxis:  Enoxaparin  CODE STATUS:   Level Of Support Discussed With: Patient  Code Status: CPR  Medical Interventions (Level of Support Prior to Arrest): Full     Electronically signed by Amilcar Reed MD, 21, 3:17 PM EDT.                                   Devon Ramirez MD   Physician   Hospitalist   Progress Notes       Signed   Date of Service:  21 1019   Creation Time:  21 101            Signed        Expand AllCollapse All      Show:Clear all  [x]Manual[x]Template[x]Copied    Added by:  [x]Devon Ramirez MD    []Kayy for details   [facility]   Hospitalist Progress Note  Date: 2021  Patient Name: Sonu Mensah  : 1980  MRN:  [id number]  Date of admission: 9/5/2021           Subjective []Expand by Default     Subjective      Chief Complaint: SOA     Summary:   Sonu Mensah is a 41 y.o. old male patient with no significant past medical history he was diagnosed with Covid 19 on 8/3/21.  He reports has been having symptoms for the previous 8 days.  Symptoms of nonproductive cough, diarrhea, subjective fevers. On arrival to the emergency department the patient was tachypneic, saturating 81% on room air.  Blood work showed an elevated white blood cell count at 17,200.  Chest x-ray showed bilateral infiltrates.  The patient was started on nonrebreather, and subsequently transition to air Vo.  Currently getting Decadron and remdesivir for COVID-19.  Treating community-acquired pneumonia with Levaquin.     Interval Followup:   Patient states he has been doing his best lying on his sides.  States he got decent sleep overnight.  On my exam patient with increased work of breathing.  Had recently use the restroom, prolonged recovery phase.  As patient is still on air Vo will consult pulmonologist today.     Review of Systems  10 out of 14 systems reviewed and all are negative except stated above           Objective      Objective      Vitals:   Temp:  [97.7 °F (36.5 °C)-98.6 °F (37 °C)] 97.9 °F (36.6 °C)  Heart Rate:  [58-79] 78  Resp:  [20-24] 20  BP: (111-128)/(65-79) 128/65  Flow (L/min):  [40] 40  Physical Exam                         Constitutional: Awake, alert, increased work of breathing              Eyes: Pupils equal, sclerae anicteric, no conjunctival injection              HENT: NCAT, mucous membranes moist              Neck: Supple, no thyromegaly, no lymphadenopathy, trachea midline              Respiratory: Bibasilar crackles on auscultation.  Poor air movement overall              Cardiovascular: RRR, no murmurs, rubs, or gallops, palpable pedal pulses bilaterally              Gastrointestinal: Positive bowel sounds, soft,  nontender, nondistended              Musculoskeletal: No bilateral ankle edema, no clubbing or cyanosis to extremities              Psychiatric: Appropriate affect, cooperative              Neurologic: Oriented x 3, strength symmetric in all extremities, Cranial Nerves grossly intact to confrontation, speech clear              Skin: No rashes            Result Review       Result Review:  I have personally reviewed the results from the time of this admission to 9/7/2021 10:22 EDT and agree with these findings:  Reviewed imaging and labs           Assessment/Plan      Assessment / Plan      Assessment/Plan:  Acute hypoxemic respiratory failure  COVID-19 pneumonia  Community-acquired pneumonia  Tobacco abuse     Plan:  Patient remains admitted to hospital for further care management  Consulting pulmonologist, appreciate assistance  Continue air Vo for oxygen saturations less than 90% wean as able  Continue Decadron  Continue remdesivir  Continue Levaquin  Starting patient on scheduled breathing treatments  Continue scheduled diuretics     Discussed plan with RN, pulmonologist     DVT prophylaxis:  Enoxaparin  CODE STATUS:   Level Of Support Discussed With: Patient  Code Status: CPR  Medical Interventions (Level of Support Prior to Arrest): Full     Electronically signed by Devon Ramirez MD, 09/07/21, 10:19 AM EDT.                             CONTACT   [facility]ILIZATION REVIEW    [facility]  [address] [place] [address]  TAX ID [id number]  NPI  [id number]       [phone]  [phone]

## 2021-09-07 NOTE — PLAN OF CARE
Goal Outcome Evaluation:  Plan of Care Reviewed With: patient        Progress: improving  Outcome Summary: pt with no complaints this shift and no new signs and symptoms noted. will monitor.

## 2021-09-07 NOTE — PLAN OF CARE
Goal Outcome Evaluation:  Plan of Care Reviewed With: patient        Progress: no change  Outcome Summary: Short of breath with minimal activity,on airvo 40 liters at 50%.

## 2021-09-08 ENCOUNTER — APPOINTMENT (OUTPATIENT)
Dept: GENERAL RADIOLOGY | Facility: HOSPITAL | Age: 41
End: 2021-09-08

## 2021-09-08 LAB
ALBUMIN SERPL-MCNC: 3.6 G/DL (ref 3.5–5.2)
ALBUMIN/GLOB SERPL: 1 G/DL
ALP SERPL-CCNC: 56 U/L (ref 39–117)
ALT SERPL W P-5'-P-CCNC: 58 U/L (ref 1–41)
ANION GAP SERPL CALCULATED.3IONS-SCNC: 12.6 MMOL/L (ref 5–15)
AST SERPL-CCNC: 17 U/L (ref 1–40)
BASOPHILS # BLD AUTO: 0.07 10*3/MM3 (ref 0–0.2)
BASOPHILS NFR BLD AUTO: 0.5 % (ref 0–1.5)
BILIRUB CONJ SERPL-MCNC: <0.2 MG/DL (ref 0–0.3)
BILIRUB SERPL-MCNC: 0.5 MG/DL (ref 0–1.2)
BUN SERPL-MCNC: 25 MG/DL (ref 6–20)
BUN/CREAT SERPL: 17.7 (ref 7–25)
CALCIUM SPEC-SCNC: 9.1 MG/DL (ref 8.6–10.5)
CHLORIDE SERPL-SCNC: 98 MMOL/L (ref 98–107)
CO2 SERPL-SCNC: 24.4 MMOL/L (ref 22–29)
CREAT SERPL-MCNC: 1.41 MG/DL (ref 0.76–1.27)
CRP SERPL-MCNC: 4.63 MG/DL (ref 0–0.5)
D DIMER PPP FEU-MCNC: 0.6 MG/L (FEU) (ref 0–0.59)
DEPRECATED RDW RBC AUTO: 41.2 FL (ref 37–54)
EOSINOPHIL # BLD AUTO: 0.01 10*3/MM3 (ref 0–0.4)
EOSINOPHIL NFR BLD AUTO: 0.1 % (ref 0.3–6.2)
ERYTHROCYTE [DISTWIDTH] IN BLOOD BY AUTOMATED COUNT: 13.2 % (ref 12.3–15.4)
FERRITIN SERPL-MCNC: 189.3 NG/ML (ref 30–400)
GFR SERPL CREATININE-BSD FRML MDRD: 56 ML/MIN/1.73
GLOBULIN UR ELPH-MCNC: 3.6 GM/DL
GLUCOSE SERPL-MCNC: 111 MG/DL (ref 65–99)
HCT VFR BLD AUTO: 50.7 % (ref 37.5–51)
HGB BLD-MCNC: 17.8 G/DL (ref 13–17.7)
IMM GRANULOCYTES # BLD AUTO: 0.8 10*3/MM3 (ref 0–0.05)
IMM GRANULOCYTES NFR BLD AUTO: 5.3 % (ref 0–0.5)
LYMPHOCYTES # BLD AUTO: 0.96 10*3/MM3 (ref 0.7–3.1)
LYMPHOCYTES NFR BLD AUTO: 6.3 % (ref 19.6–45.3)
MAGNESIUM SERPL-MCNC: 2.3 MG/DL (ref 1.6–2.6)
MCH RBC QN AUTO: 30.6 PG (ref 26.6–33)
MCHC RBC AUTO-ENTMCNC: 35.1 G/DL (ref 31.5–35.7)
MCV RBC AUTO: 87.3 FL (ref 79–97)
MONOCYTES # BLD AUTO: 1.06 10*3/MM3 (ref 0.1–0.9)
MONOCYTES NFR BLD AUTO: 7 % (ref 5–12)
NEUTROPHILS NFR BLD AUTO: 12.32 10*3/MM3 (ref 1.7–7)
NEUTROPHILS NFR BLD AUTO: 80.8 % (ref 42.7–76)
NRBC BLD AUTO-RTO: 0 /100 WBC (ref 0–0.2)
PLAT MORPH BLD: NORMAL
PLATELET # BLD AUTO: 351 10*3/MM3 (ref 140–450)
PMV BLD AUTO: 11.9 FL (ref 6–12)
POTASSIUM SERPL-SCNC: 4 MMOL/L (ref 3.5–5.2)
PROT SERPL-MCNC: 7.2 G/DL (ref 6–8.5)
RBC # BLD AUTO: 5.81 10*6/MM3 (ref 4.14–5.8)
RBC MORPH BLD: NORMAL
SODIUM SERPL-SCNC: 135 MMOL/L (ref 136–145)
WBC # BLD AUTO: 15.22 10*3/MM3 (ref 3.4–10.8)
WBC MORPH BLD: NORMAL

## 2021-09-08 PROCEDURE — 85379 FIBRIN DEGRADATION QUANT: CPT | Performed by: INTERNAL MEDICINE

## 2021-09-08 PROCEDURE — 71045 X-RAY EXAM CHEST 1 VIEW: CPT

## 2021-09-08 PROCEDURE — 83735 ASSAY OF MAGNESIUM: CPT | Performed by: INTERNAL MEDICINE

## 2021-09-08 PROCEDURE — 25010000002 DEXAMETHASONE PER 1 MG: Performed by: INTERNAL MEDICINE

## 2021-09-08 PROCEDURE — 25010000002 ENOXAPARIN PER 10 MG

## 2021-09-08 PROCEDURE — 85007 BL SMEAR W/DIFF WBC COUNT: CPT | Performed by: INTERNAL MEDICINE

## 2021-09-08 PROCEDURE — 99291 CRITICAL CARE FIRST HOUR: CPT | Performed by: INTERNAL MEDICINE

## 2021-09-08 PROCEDURE — 82248 BILIRUBIN DIRECT: CPT | Performed by: FAMILY MEDICINE

## 2021-09-08 PROCEDURE — 87205 SMEAR GRAM STAIN: CPT | Performed by: INTERNAL MEDICINE

## 2021-09-08 PROCEDURE — 94799 UNLISTED PULMONARY SVC/PX: CPT

## 2021-09-08 PROCEDURE — XW0DXM6 INTRODUCTION OF BARICITINIB INTO MOUTH AND PHARYNX, EXTERNAL APPROACH, NEW TECHNOLOGY GROUP 6: ICD-10-PCS | Performed by: INTERNAL MEDICINE

## 2021-09-08 PROCEDURE — 85025 COMPLETE CBC W/AUTO DIFF WBC: CPT | Performed by: INTERNAL MEDICINE

## 2021-09-08 PROCEDURE — 80053 COMPREHEN METABOLIC PANEL: CPT | Performed by: INTERNAL MEDICINE

## 2021-09-08 PROCEDURE — 86140 C-REACTIVE PROTEIN: CPT | Performed by: INTERNAL MEDICINE

## 2021-09-08 PROCEDURE — 99233 SBSQ HOSP IP/OBS HIGH 50: CPT | Performed by: INTERNAL MEDICINE

## 2021-09-08 PROCEDURE — 82728 ASSAY OF FERRITIN: CPT | Performed by: INTERNAL MEDICINE

## 2021-09-08 PROCEDURE — 87070 CULTURE OTHR SPECIMN AEROBIC: CPT | Performed by: INTERNAL MEDICINE

## 2021-09-08 RX ADMIN — SODIUM CHLORIDE, PRESERVATIVE FREE 10 ML: 5 INJECTION INTRAVENOUS at 20:45

## 2021-09-08 RX ADMIN — SODIUM CHLORIDE, PRESERVATIVE FREE 10 ML: 5 INJECTION INTRAVENOUS at 09:06

## 2021-09-08 RX ADMIN — REMDESIVIR 100 MG: 100 INJECTION, POWDER, LYOPHILIZED, FOR SOLUTION INTRAVENOUS at 13:56

## 2021-09-08 RX ADMIN — CALCIUM CARBONATE 2 TABLET: 500 TABLET, CHEWABLE ORAL at 06:36

## 2021-09-08 RX ADMIN — BARICITINIB 2 MG: 2 TABLET, FILM COATED ORAL at 15:31

## 2021-09-08 RX ADMIN — DEXAMETHASONE SODIUM PHOSPHATE 10 MG: 10 INJECTION INTRAMUSCULAR; INTRAVENOUS at 09:05

## 2021-09-08 RX ADMIN — ACETAMINOPHEN 650 MG: 325 TABLET ORAL at 09:36

## 2021-09-08 RX ADMIN — ENOXAPARIN SODIUM 40 MG: 40 INJECTION SUBCUTANEOUS at 09:06

## 2021-09-08 RX ADMIN — MINERAL OIL, PETROLATUM: 425; 568 OINTMENT OPHTHALMIC at 09:06

## 2021-09-08 RX ADMIN — BUDESONIDE 0.5 MG: 0.5 INHALANT ORAL at 20:41

## 2021-09-08 RX ADMIN — FLUTICASONE PROPIONATE 2 SPRAY: 50 SPRAY, METERED NASAL at 09:07

## 2021-09-08 RX ADMIN — ARFORMOTEROL TARTRATE 15 MCG: 15 SOLUTION RESPIRATORY (INHALATION) at 20:41

## 2021-09-08 RX ADMIN — ARFORMOTEROL TARTRATE 15 MCG: 15 SOLUTION RESPIRATORY (INHALATION) at 08:22

## 2021-09-08 RX ADMIN — BUDESONIDE 0.5 MG: 0.5 INHALANT ORAL at 08:22

## 2021-09-08 NOTE — PLAN OF CARE
Goal Outcome Evaluation:  Plan of Care Reviewed With: patient        Progress: no change  Outcome Summary: No complaints of pain this shift. Patient continues to have SOA. On Airvo 40L at 50%. Tolerating well

## 2021-09-08 NOTE — PROGRESS NOTES
Pulmonary / Critical Care Progress Note      Patient Name: Sonu Mensah  : 1980  MRN: [id number]  Attending:  Devon Ramirez MD  Date of admission: 2021    Subjective   Subjective   Critically ill with hypoxic respiratory failure secondary to COVID-19.    Over past 24 hours, continues on Airvo with minimal weaning.  Continues on steroids and nebulizers.     No acute events overnight.     Today,  Lying in bed talking on phone  States he is feeling better  Dyspnea improving  Productive cough now, specimen sent  On Airvo 40L 50%FIO2 with O2 sats 96%  Will attempt to change over to HFNC  Proning himself  Using IS and flutter valve  Diuresed well with -3.4 L fluid balance  No chest pain  No fever or chills     Review of Systems  General: + fatigue, otherwise denied complaints  Cardiovascular:  + GREGG, otherwise denied complaints  Respiratory: + cough, +dyspnea (improving), otherwise denied complaints  Gastrointestinal: Denied complaints       Objective   Objective     Vitals:   Temp:  [97.6 °F (36.4 °C)-98.3 °F (36.8 °C)] 98.3 °F (36.8 °C)  Heart Rate:  [67-82] 82  Resp:  [20-22] 22  BP: (115-130)/(67-84) 121/74  Flow (L/min):  [15-40] 15    Physical Exam   Vital Signs Reviewed   General: WDWN male, Awake and Alert, NAD on Airvo  HEENT:  PERRL, EOMI.  OP, nares clear  Chest:  good aeration, bibasilar crackles, tympanic to percussion bilaterally, no work of breathing noted  CV: RRR-NSR 80, no MGR, pulses 2+, equal  Abd:  Soft, NT, ND, + BS, no HSM  EXT:  no clubbing, no cyanosis, no edema  Neuro:  A&Ox3, CN grossly intact, no focal deficits  Skin: No rashes or lesions noted      Result Review    Result Review:  I have personally reviewed the results from the time of this admission to 2021 16:17 EDT and agree with these findings:  [x]  Laboratory  [x]  Microbiology  [x]  Radiology  [x]  EKG/Telemetry   []  Cardiology/Vascular   []  Pathology  []  Old records  []  Other:  Most notable findings include: WBC  15.2, Cr 1.4, K+ 4.0, Mg+ 2.3, ALT 58, AST 17, Procal 0.03  CXR with bilateral opacities essentially unchanged from previous.    COVID LABS:  Results From Last 14 Days   Lab Units 09/08/21  0425 09/07/21  0411 09/06/21  0619 09/05/21  0105   PROBNP pg/mL  --   --   --  200.7   CRP mg/dL 4.63*  --   --  5.65*   D DIMER QUANT mg/L (FEU) 0.60*  --   --  0.83*   FERRITIN ng/mL 189.30  --   --  242.20   LDH U/L  --   --   --  419*   PROCALCITONIN ng/mL  --  0.03 0.06 0.10  0.08   TROPONIN T ng/mL  --   --   --  <0.010     Assessment/Plan   Assessment / Plan     Active Hospital Problems:  Active Hospital Problems    Diagnosis    • **Acute respiratory failure with hypoxia (CMS/HCC)    • Pneumonia due to COVID-19 virus    • Tobacco abuse    • COVID-19      Impression:  Acute hypoxic respiratory failure secondary to COVID-19 quiring air Vo  ARDS  COVID-19 pneumonia  Question community-acquired pneumonia from unspecified organism   Therapeutic drug monitoring  Hyponatremia  Elevated liver enzymes  Leukocytosis  DARY secondary to prerenal etiology versus acute tubular necrosis     Plan:  Repeat CXR with persistent bilateral opacities essentially unchanged.  Procal 0.03.  Will discontinue Levaquin.  Continue to wean to keep sats >90%.  Currently on Airvo will try and switch over to HFNC and see how he tolerates.  Continue Remdesivir to complete 5 day course.  Daily monitoring of renal panel and LFTs.  Has improved marginally so will not give Tocilizumab.  Will start on Baricitinib to complete 14 day course.  Continue to trend inflammatory markers.  Continue Decadron 10 mg IV daily to complete 10 day course.  Continue nebulizers and bronchopulmonary hygiene.  Encourage IS and flutter valve.  Encourage prone and laying side to side.  Up to chair when not prone.  Renal function worsening.  Cr 0.92-->1.41.  Will stop Lasix.    DVT prophylaxis:  Medical and mechanical DVT prophylaxis orders are present.    CODE STATUS:   Level Of  Support Discussed With: Patient  Code Status: CPR  Medical Interventions (Level of Support Prior to Arrest): Full    Labs, microbiology, radiology, medications, and provider notes personally reviewed.  Discussed with primary service and bedside RN    30 minutes of critical care time spent managing this patient's acute hypoxemic respiratory failure requiring air Vo, acute kidney injury, COVID-19.  This was excluding procedures.    Electronically signed by REYNOLD Claros, 09/08/21, 1:16 PM EDT.  Electronically signed by Malcolm Michael MD, 09/08/21, 4:19 PM EDT.

## 2021-09-08 NOTE — PLAN OF CARE
Goal Outcome Evaluation:  Plan of Care Reviewed With: patient        Progress: improving  Outcome Summary: Patient now on 15liters high flow. No other changes.

## 2021-09-08 NOTE — PROGRESS NOTES
[facility]   Hospitalist Progress Note  Date: 2021  Patient Name: Sonu Mensah  : 1980  MRN: [id number]  Date of admission: 2021      Subjective   Subjective     Chief Complaint: SOA    Summary:   Sonu Mensah is a 41 y.o. old male patient with no significant past medical history he was diagnosed with Covid 19 on 8/3/21.  He reports has been having symptoms for the previous 8 days.  Symptoms of nonproductive cough, diarrhea, subjective fevers. On arrival to the emergency department the patient was tachypneic, saturating 81% on room air.  Blood work showed an elevated white blood cell count at 17,200.  Chest x-ray showed bilateral infiltrates.  The patient was started on nonrebreather, and subsequently transition to air Vo.  Currently getting Decadron and remdesivir for COVID-19.  Treating community-acquired pneumonia with Levaquin.     Interval Followup:   Patient continues to do his best lying on his sides.  Unable to tolerate long periods laying on his belly.  Continues to work with incentive spirometer and flutter valve.  Patient remains on 40 L 50%.  No acute issues overnight.  Endorses exertional dyspnea with getting up using bedside commode    Review of Systems  10 out of 14 systems reviewed and all are negative except stated above    Objective   Objective     Vitals:   Temp:  [97.9 °F (36.6 °C)-98.1 °F (36.7 °C)] 98.1 °F (36.7 °C)  Heart Rate:  [67-79] 67  Resp:  [20-22] 22  BP: (115-130)/(67-84) 130/84  Flow (L/min):  [40] 40  Physical Exam    Constitutional: Awake, alert, increased work of breathing   Eyes: Pupils equal, sclerae anicteric, no conjunctival injection   HENT: NCAT, mucous membranes moist   Neck: Supple, no thyromegaly, no lymphadenopathy, trachea midline   Respiratory: Bibasilar crackles on auscultation.  Poor air movement overall   Cardiovascular: RRR, no murmurs, rubs, or gallops, palpable pedal pulses bilaterally   Gastrointestinal: Positive bowel sounds, soft,  nontender, nondistended   Musculoskeletal: No bilateral ankle edema, no clubbing or cyanosis to extremities   Psychiatric: Appropriate affect, cooperative   Neurologic: Oriented x 3, strength symmetric in all extremities, Cranial Nerves grossly intact to confrontation, speech clear   Skin: No rashes     Result Review    Result Review:  I have personally reviewed the results from the time of this admission to 9/8/2021 11:00 EDT and agree with these findings:  Reviewed imaging and labs    Assessment/Plan   Assessment / Plan     Assessment/Plan:  Acute hypoxemic respiratory failure  COVID-19 pneumonia  Community-acquired pneumonia  Tobacco abuse    Plan:  Patient remains admitted to hospital for further care and management  Consulting pulmonologist, appreciate assistance  Continue air Vo for oxygen saturations less than 90%   Continue to wean as able, hopefully patient can be switched over to high flow nasal cannula today  Continue Decadron  Continue remdesivir, therapeutic drug monitoring  Continue Levaquin  Starting patient on scheduled breathing treatments  Continue scheduled diuretics    Discussed plan with RN, pulmonologist    DVT prophylaxis:  Enoxaparin  CODE STATUS:   Level Of Support Discussed With: Patient  Code Status: CPR  Medical Interventions (Level of Support Prior to Arrest): Full    Electronically signed by Devon Ramirez MD, 09/08/21, 11:00 AM EDT.

## 2021-09-09 LAB
ALBUMIN SERPL-MCNC: 3.6 G/DL (ref 3.5–5.2)
ALBUMIN/GLOB SERPL: 1.1 G/DL
ALP SERPL-CCNC: 60 U/L (ref 39–117)
ALT SERPL W P-5'-P-CCNC: 46 U/L (ref 1–41)
ANION GAP SERPL CALCULATED.3IONS-SCNC: 11.1 MMOL/L (ref 5–15)
ANION GAP SERPL CALCULATED.3IONS-SCNC: 14.1 MMOL/L (ref 5–15)
AST SERPL-CCNC: 17 U/L (ref 1–40)
BILIRUB SERPL-MCNC: 0.4 MG/DL (ref 0–1.2)
BUN SERPL-MCNC: 23 MG/DL (ref 6–20)
BUN SERPL-MCNC: 24 MG/DL (ref 6–20)
BUN/CREAT SERPL: 18.8 (ref 7–25)
BUN/CREAT SERPL: 19.2 (ref 7–25)
CALCIUM SPEC-SCNC: 9.1 MG/DL (ref 8.6–10.5)
CALCIUM SPEC-SCNC: 9.4 MG/DL (ref 8.6–10.5)
CHLORIDE SERPL-SCNC: 97 MMOL/L (ref 98–107)
CHLORIDE SERPL-SCNC: 99 MMOL/L (ref 98–107)
CO2 SERPL-SCNC: 21.9 MMOL/L (ref 22–29)
CO2 SERPL-SCNC: 24.9 MMOL/L (ref 22–29)
CREAT SERPL-MCNC: 1.2 MG/DL (ref 0.76–1.27)
CREAT SERPL-MCNC: 1.28 MG/DL (ref 0.76–1.27)
CRP SERPL-MCNC: 1.35 MG/DL (ref 0–0.5)
D DIMER PPP FEU-MCNC: 0.41 MG/L (FEU) (ref 0–0.59)
FERRITIN SERPL-MCNC: 180 NG/ML (ref 30–400)
GFR SERPL CREATININE-BSD FRML MDRD: 63 ML/MIN/1.73
GFR SERPL CREATININE-BSD FRML MDRD: 67 ML/MIN/1.73
GLOBULIN UR ELPH-MCNC: 3.4 GM/DL
GLUCOSE SERPL-MCNC: 110 MG/DL (ref 65–99)
GLUCOSE SERPL-MCNC: 232 MG/DL (ref 65–99)
MAGNESIUM SERPL-MCNC: 2.3 MG/DL (ref 1.6–2.6)
POTASSIUM SERPL-SCNC: 4.3 MMOL/L (ref 3.5–5.2)
POTASSIUM SERPL-SCNC: 4.4 MMOL/L (ref 3.5–5.2)
PROT SERPL-MCNC: 7 G/DL (ref 6–8.5)
SODIUM SERPL-SCNC: 133 MMOL/L (ref 136–145)
SODIUM SERPL-SCNC: 135 MMOL/L (ref 136–145)

## 2021-09-09 PROCEDURE — 99233 SBSQ HOSP IP/OBS HIGH 50: CPT | Performed by: INTERNAL MEDICINE

## 2021-09-09 PROCEDURE — 25010000002 ENOXAPARIN PER 10 MG

## 2021-09-09 PROCEDURE — 94760 N-INVAS EAR/PLS OXIMETRY 1: CPT

## 2021-09-09 PROCEDURE — 85379 FIBRIN DEGRADATION QUANT: CPT | Performed by: NURSE PRACTITIONER

## 2021-09-09 PROCEDURE — 25010000002 DEXAMETHASONE PER 1 MG: Performed by: INTERNAL MEDICINE

## 2021-09-09 PROCEDURE — 80048 BASIC METABOLIC PNL TOTAL CA: CPT | Performed by: INTERNAL MEDICINE

## 2021-09-09 PROCEDURE — 94799 UNLISTED PULMONARY SVC/PX: CPT

## 2021-09-09 PROCEDURE — 83735 ASSAY OF MAGNESIUM: CPT | Performed by: INTERNAL MEDICINE

## 2021-09-09 PROCEDURE — 86140 C-REACTIVE PROTEIN: CPT | Performed by: NURSE PRACTITIONER

## 2021-09-09 PROCEDURE — 80053 COMPREHEN METABOLIC PANEL: CPT | Performed by: NURSE PRACTITIONER

## 2021-09-09 PROCEDURE — 82728 ASSAY OF FERRITIN: CPT | Performed by: NURSE PRACTITIONER

## 2021-09-09 PROCEDURE — 25010000002 FUROSEMIDE PER 20 MG: Performed by: INTERNAL MEDICINE

## 2021-09-09 RX ORDER — FUROSEMIDE 10 MG/ML
40 INJECTION INTRAMUSCULAR; INTRAVENOUS ONCE
Status: COMPLETED | OUTPATIENT
Start: 2021-09-09 | End: 2021-09-09

## 2021-09-09 RX ADMIN — ACETAMINOPHEN 650 MG: 325 TABLET ORAL at 09:53

## 2021-09-09 RX ADMIN — ENOXAPARIN SODIUM 40 MG: 40 INJECTION SUBCUTANEOUS at 09:53

## 2021-09-09 RX ADMIN — BUDESONIDE 0.5 MG: 0.5 INHALANT ORAL at 09:21

## 2021-09-09 RX ADMIN — BARICITINIB 4 MG: 2 TABLET, FILM COATED ORAL at 16:20

## 2021-09-09 RX ADMIN — FUROSEMIDE 40 MG: 10 INJECTION, SOLUTION INTRAMUSCULAR; INTRAVENOUS at 09:52

## 2021-09-09 RX ADMIN — ARFORMOTEROL TARTRATE 15 MCG: 15 SOLUTION RESPIRATORY (INHALATION) at 20:21

## 2021-09-09 RX ADMIN — REMDESIVIR 100 MG: 100 INJECTION, POWDER, LYOPHILIZED, FOR SOLUTION INTRAVENOUS at 13:55

## 2021-09-09 RX ADMIN — BUDESONIDE 0.5 MG: 0.5 INHALANT ORAL at 20:21

## 2021-09-09 RX ADMIN — SODIUM CHLORIDE, PRESERVATIVE FREE 10 ML: 5 INJECTION INTRAVENOUS at 21:29

## 2021-09-09 RX ADMIN — DEXAMETHASONE SODIUM PHOSPHATE 10 MG: 10 INJECTION INTRAMUSCULAR; INTRAVENOUS at 09:52

## 2021-09-09 RX ADMIN — ARFORMOTEROL TARTRATE 15 MCG: 15 SOLUTION RESPIRATORY (INHALATION) at 09:20

## 2021-09-09 RX ADMIN — SODIUM CHLORIDE, PRESERVATIVE FREE 10 ML: 5 INJECTION INTRAVENOUS at 09:54

## 2021-09-09 NOTE — PROGRESS NOTES
Pulmonary / Critical Care Progress Note      Patient Name: Sonu Mensah  : 1980  MRN: [id number]  Attending:  Devon Ramirez MD  Date of admission: 2021    Subjective   Subjective   Critically ill with hypoxic respiratory failure secondary to COVID-19.    21-->COVID +, Currently receiving Remdesivir and Baricitinib.    Over past 24 hours, was able to wean from Airvo to 11L.  Continues on Remdesivir, baricitinib, steroids, and nebulizers.     No acute events overnight.     Today,  Lying in bed talking on phone  States he feels so much better today  On 11 L with O2 sats 97%, decreased to 9L  Dyspnea improving  Mainly just with exertion  Productive cough with clear sputum  Proning himself during day/night  Using IS and flutter valve  No chest pain  No fever or chills  Very compliant with treatment regimen        Review of Systems  General: + fatigue, otherwise denied complaints  Cardiovascular:  + GREGG, otherwise denied complaints  Respiratory: + cough, +dyspnea (improving), otherwise denied complaints  Gastrointestinal: Denied complaints       Objective   Objective     Vitals:   Temp:  [97.5 °F (36.4 °C)-98.3 °F (36.8 °C)] 97.7 °F (36.5 °C)  Heart Rate:  [60-82] 60  Resp:  [20-22] 20  BP: (119-134)/(69-82) 120/69  Flow (L/min):  [13-40] 13    Physical Exam   Vital Signs Reviewed   General: WDWN male, Awake and Alert, NAD on 9L HFNC  HEENT:  PERRL, EOMI.  OP, nares clear  Chest:  good aeration, bibasilar crackles, tympanic to percussion bilaterally, no work of breathing noted  CV: RRR, no MGR, pulses 2+, equal  Abd:  Soft, NT, ND, + BS, no HSM  EXT:  no clubbing, no cyanosis, no edema  Neuro:  A&Ox3, CN grossly intact, no focal deficits  Skin: No rashes or lesions noted    Result Review    Result Review:  I have personally reviewed the results from the time of this admission to 2021 08:02 EDT and agree with these findings:  [x]  Laboratory  [x]  Microbiology  [x]  Radiology  [x]  EKG/Telemetry    []  Cardiology/Vascular   []  Pathology  []  Old records  []  Other:  Most notable findings include: Cr 1.28, K+ 4.3, Mg+ 2.3      COVID LABS:  Results From Last 14 Days   Lab Units 09/09/21  0503 09/08/21  0425 09/07/21  0411 09/06/21  0619 09/05/21  0105   PROBNP pg/mL  --   --   --   --  200.7   CRP mg/dL 1.35* 4.63*  --   --  5.65*   D DIMER QUANT mg/L (FEU) 0.41 0.60*  --   --  0.83*   FERRITIN ng/mL 180.00 189.30  --   --  242.20   LDH U/L  --   --   --   --  419*   PROCALCITONIN ng/mL  --   --  0.03 0.06 0.10  0.08   TROPONIN T ng/mL  --   --   --   --  <0.010     Assessment/Plan   Assessment / Plan     Active Hospital Problems:  Active Hospital Problems    Diagnosis    • **Acute respiratory failure with hypoxia (CMS/HCC)    • Pneumonia due to COVID-19 virus    • Tobacco abuse    • COVID-19      Impression:  Acute hypoxic respiratory failure secondary to COVID-19 quiring air Vo  ARDS  COVID-19 pneumonia  Question community-acquired pneumonia from unspecified organism   Therapeutic drug monitoring  Hyponatremia  Elevated liver enzymes  Leukocytosis  DARY secondary to prerenal etiology versus acute tubular necrosis     Plan:  Continue to wean to keep sats >90%.  Continue Remdesivir to complete 5 day course, day 5.  Continue Baricitinib to complete 14 days, day 2.  Daily monitoring of renal panel and LFTs.  Continue to trend inflammatory markers.  Continue Decadron 10 mg IV daily to complete 10 day course.  Continue nebulizers and bronchopulmonary hygiene.  Encourage IS and flutter valve.  Encourage prone and laying side to side.  Up to chair when not prone.  Renal function improved Cr 0.92-->1.41--> 1.2.  Will give Lasix 40 mg IV x1.  Trend renal panel and electrolytes.    DVT prophylaxis:  Medical and mechanical DVT prophylaxis orders are present.    CODE STATUS:   Level Of Support Discussed With: Patient  Code Status: CPR  Medical Interventions (Level of Support Prior to Arrest): Full      Labs,  microbiology, radiology, medications, and provider notes personally reviewed.  Discussed with primary service and bedside RN    Electronically signed by REYNOLD Claros, 09/09/21, 2:58 PM EDT.  Electronically signed by Malcolm Michael MD, 09/09/21, 5:51 PM EDT.

## 2021-09-09 NOTE — PLAN OF CARE
Goal Outcome Evaluation:  Plan of Care Reviewed With: patient        Progress: no change   Pt is still on HF. No other changes in pt condition this shift. Marcela Downey RN

## 2021-09-09 NOTE — SIGNIFICANT NOTE
09/09/21 1210   Coping/Psychosocial   Observed Emotional State calm;happy   Verbalized Emotional State happiness;hopefulness   Trust Relationship/Rapport empathic listening provided   Involvement in Care interacting with patient   Spiritual Care   Spiritual Care Visit Type initial   Spiritual Care Source  initiative   Receptivity to Spiritual Care visit welcomed   Spiritual Care Interventions supportive conversation provided   Response to Spiritual Care thanks expressed;receptive of support   Use of Spiritual Resources spirituality for coping, indicated strong use of   Spiritual Care Follow-Up follow-up, none required as presently assessed

## 2021-09-09 NOTE — PROGRESS NOTES
[facility]   Hospitalist Progress Note  Date: 2021  Patient Name: Sonu Mensah  : 1980  MRN: [id number]  Date of admission: 2021      Subjective   Subjective     Chief Complaint: SOA    Summary:   Sonu Mensah is a 41 y.o. old male patient with no significant past medical history he was diagnosed with Covid 19 on 8/3/21.  He reports has been having symptoms for the previous 8 days.  Symptoms of nonproductive cough, diarrhea, subjective fevers. On arrival to the emergency department the patient was tachypneic, saturating 81% on room air.  Blood work showed an elevated white blood cell count at 17,200.  Chest x-ray showed bilateral infiltrates.  The patient was started on nonrebreather, and subsequently transition to air Vo.  Currently getting Decadron and remdesivir for COVID-19.  Treating community-acquired pneumonia with Levaquin.     Interval Followup:   The past 24 hours patient is weaned off of air Vo is now on high flow nasal cannula, currently 13 L.  Patient states he feels better than yesterday.  Continues to work aggressively with bronchopulmonary hygiene.  Patient continues to lay on his belly and sides majority of the day.  Patient hopeful he can discharge in less than a week    Review of Systems  10 out of 14 systems reviewed and all are negative except stated above    Objective   Objective     Vitals:   Temp:  [97.5 °F (36.4 °C)-98.3 °F (36.8 °C)] 97.7 °F (36.5 °C)  Heart Rate:  [60-82] 60  Resp:  [20-22] 20  BP: (119-134)/(69-82) 120/69  Flow (L/min):  [13-40] 13  Physical Exam    Constitutional: Awake, alert, no increased work of breathing   Eyes: Pupils equal, sclerae anicteric, no conjunctival injection   HENT: NCAT, mucous membranes moist   Neck: Supple, no thyromegaly, no lymphadenopathy, trachea midline   Respiratory: Improved aeration, diminished bases, no tachypnea   Cardiovascular: RRR, no murmurs, rubs, or gallops, palpable pedal pulses  bilaterally   Gastrointestinal: Positive bowel sounds, soft, nontender, nondistended   Musculoskeletal: No bilateral ankle edema, no clubbing or cyanosis to extremities   Psychiatric: Appropriate affect, cooperative   Neurologic: Oriented x 3, strength symmetric in all extremities, Cranial Nerves grossly intact to confrontation, speech clear   Skin: No rashes     Result Review    Result Review:  I have personally reviewed the results from the time of this admission to 9/9/2021 09:06 EDT and agree with these findings:  Reviewed imaging and labs    Assessment/Plan   Assessment / Plan     Assessment/Plan:  Acute hypoxemic respiratory failure  COVID-19 pneumonia  Community-acquired pneumonia  Tobacco abuse    Plan:  Patient remains admitted to hospital for further care and management  Consulting pulmonologist, appreciate assistance  Patient is transition to high flow nasal cannula, continue to wean as able  Continue Decadron  Continue remdesivir, therapeutic drug monitoring  Starting patient on scheduled breathing treatments  Continue scheduled diuretics    Discussed plan with RN, pulmonologist    DVT prophylaxis:  Enoxaparin  CODE STATUS:   Level Of Support Discussed With: Patient  Code Status: CPR  Medical Interventions (Level of Support Prior to Arrest): Full    Electronically signed by Devon Ramirez MD, 09/09/21, 9:06 AM EDT.

## 2021-09-10 LAB
ALBUMIN SERPL-MCNC: 3.5 G/DL (ref 3.5–5.2)
ALBUMIN/GLOB SERPL: 1.2 G/DL
ALP SERPL-CCNC: 53 U/L (ref 39–117)
ALT SERPL W P-5'-P-CCNC: 37 U/L (ref 1–41)
ANION GAP SERPL CALCULATED.3IONS-SCNC: 10.3 MMOL/L (ref 5–15)
AST SERPL-CCNC: 13 U/L (ref 1–40)
BACTERIA SPEC AEROBE CULT: NORMAL
BACTERIA SPEC RESP CULT: NORMAL
BASOPHILS # BLD AUTO: 0.04 10*3/MM3 (ref 0–0.2)
BASOPHILS NFR BLD AUTO: 0.3 % (ref 0–1.5)
BILIRUB SERPL-MCNC: 0.5 MG/DL (ref 0–1.2)
BUN SERPL-MCNC: 21 MG/DL (ref 6–20)
BUN/CREAT SERPL: 21 (ref 7–25)
CALCIUM SPEC-SCNC: 9.8 MG/DL (ref 8.6–10.5)
CHLORIDE SERPL-SCNC: 99 MMOL/L (ref 98–107)
CO2 SERPL-SCNC: 25.7 MMOL/L (ref 22–29)
CREAT SERPL-MCNC: 1 MG/DL (ref 0.76–1.27)
CRP SERPL-MCNC: 0.69 MG/DL (ref 0–0.5)
D DIMER PPP FEU-MCNC: 0.51 MG/L (FEU) (ref 0–0.59)
DEPRECATED RDW RBC AUTO: 43.9 FL (ref 37–54)
EOSINOPHIL # BLD AUTO: 0.02 10*3/MM3 (ref 0–0.4)
EOSINOPHIL NFR BLD AUTO: 0.1 % (ref 0.3–6.2)
ERYTHROCYTE [DISTWIDTH] IN BLOOD BY AUTOMATED COUNT: 13.2 % (ref 12.3–15.4)
FERRITIN SERPL-MCNC: 185.6 NG/ML (ref 30–400)
GFR SERPL CREATININE-BSD FRML MDRD: 83 ML/MIN/1.73
GLOBULIN UR ELPH-MCNC: 3 GM/DL
GLUCOSE SERPL-MCNC: 107 MG/DL (ref 65–99)
GRAM STN SPEC: NORMAL
HCT VFR BLD AUTO: 51.2 % (ref 37.5–51)
HGB BLD-MCNC: 17.3 G/DL (ref 13–17.7)
IMM GRANULOCYTES # BLD AUTO: 0.36 10*3/MM3 (ref 0–0.05)
IMM GRANULOCYTES NFR BLD AUTO: 2.4 % (ref 0–0.5)
LYMPHOCYTES # BLD AUTO: 1.16 10*3/MM3 (ref 0.7–3.1)
LYMPHOCYTES NFR BLD AUTO: 7.7 % (ref 19.6–45.3)
MAGNESIUM SERPL-MCNC: 2.1 MG/DL (ref 1.6–2.6)
MCH RBC QN AUTO: 30.5 PG (ref 26.6–33)
MCHC RBC AUTO-ENTMCNC: 33.8 G/DL (ref 31.5–35.7)
MCV RBC AUTO: 90.3 FL (ref 79–97)
MONOCYTES # BLD AUTO: 1.11 10*3/MM3 (ref 0.1–0.9)
MONOCYTES NFR BLD AUTO: 7.3 % (ref 5–12)
NEUTROPHILS NFR BLD AUTO: 12.44 10*3/MM3 (ref 1.7–7)
NEUTROPHILS NFR BLD AUTO: 82.2 % (ref 42.7–76)
NRBC BLD AUTO-RTO: 0 /100 WBC (ref 0–0.2)
PLATELET # BLD AUTO: 332 10*3/MM3 (ref 140–450)
PMV BLD AUTO: 11.5 FL (ref 6–12)
POTASSIUM SERPL-SCNC: 4.5 MMOL/L (ref 3.5–5.2)
PROT SERPL-MCNC: 6.5 G/DL (ref 6–8.5)
RBC # BLD AUTO: 5.67 10*6/MM3 (ref 4.14–5.8)
SODIUM SERPL-SCNC: 135 MMOL/L (ref 136–145)
WBC # BLD AUTO: 15.13 10*3/MM3 (ref 3.4–10.8)

## 2021-09-10 PROCEDURE — 25010000002 FUROSEMIDE PER 20 MG: Performed by: INTERNAL MEDICINE

## 2021-09-10 PROCEDURE — 85025 COMPLETE CBC W/AUTO DIFF WBC: CPT | Performed by: NURSE PRACTITIONER

## 2021-09-10 PROCEDURE — 82728 ASSAY OF FERRITIN: CPT | Performed by: NURSE PRACTITIONER

## 2021-09-10 PROCEDURE — 94799 UNLISTED PULMONARY SVC/PX: CPT

## 2021-09-10 PROCEDURE — 86140 C-REACTIVE PROTEIN: CPT | Performed by: NURSE PRACTITIONER

## 2021-09-10 PROCEDURE — 99233 SBSQ HOSP IP/OBS HIGH 50: CPT | Performed by: INTERNAL MEDICINE

## 2021-09-10 PROCEDURE — 85379 FIBRIN DEGRADATION QUANT: CPT | Performed by: NURSE PRACTITIONER

## 2021-09-10 PROCEDURE — 25010000002 DEXAMETHASONE PER 1 MG: Performed by: INTERNAL MEDICINE

## 2021-09-10 PROCEDURE — 83735 ASSAY OF MAGNESIUM: CPT | Performed by: INTERNAL MEDICINE

## 2021-09-10 PROCEDURE — 25010000002 ENOXAPARIN PER 10 MG

## 2021-09-10 PROCEDURE — 99232 SBSQ HOSP IP/OBS MODERATE 35: CPT | Performed by: INTERNAL MEDICINE

## 2021-09-10 PROCEDURE — 80053 COMPREHEN METABOLIC PANEL: CPT | Performed by: NURSE PRACTITIONER

## 2021-09-10 RX ORDER — FUROSEMIDE 10 MG/ML
40 INJECTION INTRAMUSCULAR; INTRAVENOUS ONCE
Status: COMPLETED | OUTPATIENT
Start: 2021-09-10 | End: 2021-09-10

## 2021-09-10 RX ADMIN — ENOXAPARIN SODIUM 40 MG: 40 INJECTION SUBCUTANEOUS at 08:32

## 2021-09-10 RX ADMIN — ARFORMOTEROL TARTRATE 15 MCG: 15 SOLUTION RESPIRATORY (INHALATION) at 20:10

## 2021-09-10 RX ADMIN — SODIUM CHLORIDE, PRESERVATIVE FREE 10 ML: 5 INJECTION INTRAVENOUS at 08:32

## 2021-09-10 RX ADMIN — FLUTICASONE PROPIONATE 2 SPRAY: 50 SPRAY, METERED NASAL at 08:32

## 2021-09-10 RX ADMIN — DEXAMETHASONE SODIUM PHOSPHATE 10 MG: 10 INJECTION INTRAMUSCULAR; INTRAVENOUS at 08:31

## 2021-09-10 RX ADMIN — BUDESONIDE 0.5 MG: 0.5 INHALANT ORAL at 20:10

## 2021-09-10 RX ADMIN — MINERAL OIL, PETROLATUM: 425; 568 OINTMENT OPHTHALMIC at 16:28

## 2021-09-10 RX ADMIN — BARICITINIB 4 MG: 2 TABLET, FILM COATED ORAL at 16:18

## 2021-09-10 RX ADMIN — SODIUM CHLORIDE, PRESERVATIVE FREE 10 ML: 5 INJECTION INTRAVENOUS at 21:15

## 2021-09-10 RX ADMIN — MINERAL OIL, PETROLATUM: 425; 568 OINTMENT OPHTHALMIC at 21:16

## 2021-09-10 RX ADMIN — FUROSEMIDE 40 MG: 10 INJECTION, SOLUTION INTRAMUSCULAR; INTRAVENOUS at 10:43

## 2021-09-10 RX ADMIN — BUDESONIDE 0.5 MG: 0.5 INHALANT ORAL at 09:18

## 2021-09-10 RX ADMIN — ARFORMOTEROL TARTRATE 15 MCG: 15 SOLUTION RESPIRATORY (INHALATION) at 09:18

## 2021-09-10 NOTE — PROGRESS NOTES
Pulmonary / Critical Care Progress Note      Patient Name: Sonu Mensah  : 1980  MRN: [id number]  Attending:  Devon Ramirez MD  Date of admission: 2021    Subjective   Subjective   Critically ill with hypoxic respiratory failure secondary to COVID-19.    21-->COVID +, Currently receiving Baricitinib. Has completed Remdesivir.    Over past 24 hours, continues to wean O2.  Continues on baricitinib, steroids, and nebulizers.     No acute events overnight.     Today,  Lying in bed talking on phone  Very energetic and wants to do what he can to get out of here  On 5 L with O2 sats 89% but has been on phone talking  Dyspnea improving  Mainly just with exertion  Nonproductive cough  Proning himself during day/night  Using IS and flutter valve  No chest pain  No fever or chillsWants to go home     Review of Systems  General: + fatigue, otherwise denied complaints  Cardiovascular:  + GREGG (improving), otherwise denied complaints  Respiratory: + cough (improving), +dyspnea (improving), otherwise denied complaints  Gastrointestinal: Denied complaints     Objective   Objective     Vitals:   Temp:  [97.2 °F (36.2 °C)-98.1 °F (36.7 °C)] 98.1 °F (36.7 °C)  Heart Rate:  [62-87] 84  Resp:  [18-20] 20  BP: (121-129)/(65-88) 129/88  Flow (L/min):  [6] 6    Physical Exam   Vital Signs Reviewed   General: WDWN male, Awake and Alert, NAD on NC  HEENT:  PERRL, EOMI.  OP, nares clear  Chest:  good aeration, diminished bilaterally, tympanic to percussion bilaterally, no work of breathing noted  CV: RRR, no MGR, pulses 2+, equal  Abd:  Soft, NT, ND, + BS, no HSM  EXT:  no clubbing, no cyanosis, no edema  Neuro:  A&Ox3, CN grossly intact, no focal deficits  Skin: No rashes or lesions noted    Result Review    Result Review:  I have personally reviewed the results from the time of this admission to 9/10/2021 15:57 EDT and agree with these findings:  [x]  Laboratory  [x]  Microbiology  [x]  Radiology  [x]  EKG/Telemetry   []   Cardiology/Vascular   []  Pathology  []  Old records  []  Other:  Most notable findings include: WBC 15.13, Cr 1.0, K+ 4.5, Mg+ 2.1    COVID LABS:  Results From Last 14 Days   Lab Units 09/10/21  0525 09/09/21  0503 09/08/21  0425 09/07/21  0411 09/06/21  0619 09/05/21  0105 09/05/21  0105   PROBNP pg/mL  --   --   --   --   --   --  200.7   CRP mg/dL 0.69* 1.35* 4.63*  --   --    < > 5.65*   D DIMER QUANT mg/L (FEU) 0.51 0.41 0.60*  --   --    < > 0.83*   FERRITIN ng/mL 185.60 180.00 189.30  --   --    < > 242.20   LDH U/L  --   --   --   --   --   --  419*   PROCALCITONIN ng/mL  --   --   --  0.03 0.06  --  0.10  0.08   TROPONIN T ng/mL  --   --   --   --   --   --  <0.010    < > = values in this interval not displayed.     Assessment/Plan   Assessment / Plan     Active Hospital Problems:  Active Hospital Problems    Diagnosis    • **Acute respiratory failure with hypoxia (CMS/HCC)    • Pneumonia due to COVID-19 virus    • Tobacco abuse    • COVID-19      Impression:  Acute hypoxic respiratory failure secondary to COVID-19 quiring air Vo  ARDS  COVID-19 pneumonia  Question community-acquired pneumonia from unspecified organism   Therapeutic drug monitoring  Hyponatremia  Elevated liver enzymes  Leukocytosis  DARY secondary to prerenal etiology versus acute tubular necrosis     Plan:  Continue to wean to keep sats >90%.  Completed 5 day course of Remdesivir.  Continue Baricitinib to complete 14 days, day 3.  Daily monitoring of renal panel and LFTs.  Continue to trend inflammatory markers.  Continue Decadron 10 mg IV daily to complete 10 day course.  Continue nebulizers and bronchopulmonary hygiene.  Encourage IS and flutter valve.  Encourage prone and laying side to side.  Up to chair when not prone.  Will give Lasix 40 mg IV x1.  Trend renal panel and electrolytes.    Anticipate discharge in the next 24 hours.    DVT prophylaxis:  Medical and mechanical DVT prophylaxis orders are present.    CODE STATUS:   Level  Of Support Discussed With: Patient  Code Status: CPR  Medical Interventions (Level of Support Prior to Arrest): Full      Labs, microbiology, radiology, medications, and provider notes personally reviewed.  Discussed with primary service and bedside RN    Electronically signed by REYNOLD Claros, 09/10/21, 12:38 PM EDT.  Electronically signed by Malcolm Michael MD, 09/10/21, 3:58 PM EDT.

## 2021-09-10 NOTE — PLAN OF CARE
Goal Outcome Evaluation:  Pt titrated o2 down from 7L/NC to 3L/NC. Pt able to maintain O2 sats >90% on 3L and states he feels much better today and is anticipating discharge tomorrow.

## 2021-09-10 NOTE — PROGRESS NOTES
[facility]   Hospitalist Progress Note  Date: 9/10/2021  Patient Name: Sonu Mensah  : 1980  MRN: [id number]  Date of admission: 2021      Subjective   Subjective     Chief Complaint: SOA    Summary:   Sonu Mensah is a 41 y.o. old male patient with no significant past medical history he was diagnosed with Covid 19 on 8/3/21.  He reports has been having symptoms for the previous 8 days.  Symptoms of nonproductive cough, diarrhea, subjective fevers. On arrival to the emergency department the patient was tachypneic, saturating 81% on room air.  Blood work showed an elevated white blood cell count at 17,200.  Chest x-ray showed bilateral infiltrates.  The patient was started on nonrebreather, and subsequently transition to air Vo.  Currently getting Decadron and remdesivir for COVID-19.  Treating community-acquired pneumonia with Levaquin.     Interval Followup:   Over the past 24 hours patient now down to 6 L high flow nasal cannula.  Patient continues to feel better each day.  Hopefully patient will be able to discharge within the next 24 to 48 hours.  Patient still very on top of lying prone on his sides, using incentive spirometer and flutter valve.    Review of Systems  10 out of 14 systems reviewed and all are negative except stated above    Objective   Objective     Vitals:   Temp:  [97.2 °F (36.2 °C)-98.1 °F (36.7 °C)] 98.1 °F (36.7 °C)  Heart Rate:  [62-87] 84  Resp:  [18-20] 20  BP: (121-129)/(65-88) 129/88  Flow (L/min):  [6] 6  Physical Exam    Constitutional: Awake, alert, no increased work of breathing   Eyes: Pupils equal, sclerae anicteric, no conjunctival injection   HENT: NCAT, mucous membranes moist   Neck: Supple, no thyromegaly, no lymphadenopathy, trachea midline   Respiratory: Diminished bilaterally, however improved aeration over the past 24 hours   Cardiovascular: RRR, no murmurs, rubs, or gallops, palpable pedal pulses bilaterally   Gastrointestinal: Positive bowel  sounds, soft, nontender, nondistended   Musculoskeletal: No bilateral ankle edema, no clubbing or cyanosis to extremities   Psychiatric: Appropriate affect, cooperative   Neurologic: Oriented x 3, strength symmetric in all extremities, Cranial Nerves grossly intact to confrontation, speech clear   Skin: No rashes     Result Review    Result Review:  I have personally reviewed the results from the time of this admission to 9/10/2021 15:35 EDT and agree with these findings:  Reviewed imaging and labs    Assessment/Plan   Assessment / Plan     Assessment/Plan:  Acute hypoxemic respiratory failure  COVID-19 pneumonia  Community-acquired pneumonia  Tobacco abuse    Plan:  Patient remains admitted to hospital for further care and management  Consulting pulmonologist, appreciate assistance  Patient continues to wean down on high flow nasal cannula, currently on 6 L  Continue Decadron  Continue remdesivir, therapeutic drug monitoring  Starting patient on scheduled breathing treatments  Continue scheduled diuretics    Discussed plan with RN, pulmonologist    DVT prophylaxis:  Enoxaparin  CODE STATUS:   Level Of Support Discussed With: Patient  Code Status: CPR  Medical Interventions (Level of Support Prior to Arrest): Full    Electronically signed by Devon Ramirez MD, 09/10/21, 3:44 PM EDT.

## 2021-09-10 NOTE — PLAN OF CARE
Goal Outcome Evaluation:  Tolerating 6L high flow nasal canula, o2 sats 91-93% this shift. Eager to go home. Participating in care, performing deep breathing exercises and incentive spirometry without prompts. No complaints at this time. JAIRO, RN

## 2021-09-11 ENCOUNTER — READMISSION MANAGEMENT (OUTPATIENT)
Dept: CALL CENTER | Facility: HOSPITAL | Age: 41
End: 2021-09-11

## 2021-09-11 VITALS
WEIGHT: 197.31 LBS | BODY MASS INDEX: 27.62 KG/M2 | HEIGHT: 71 IN | DIASTOLIC BLOOD PRESSURE: 85 MMHG | HEART RATE: 81 BPM | RESPIRATION RATE: 18 BRPM | SYSTOLIC BLOOD PRESSURE: 117 MMHG | OXYGEN SATURATION: 90 % | TEMPERATURE: 97.5 F

## 2021-09-11 LAB
ALBUMIN SERPL-MCNC: 3.5 G/DL (ref 3.5–5.2)
ALBUMIN/GLOB SERPL: 1.2 G/DL
ALP SERPL-CCNC: 53 U/L (ref 39–117)
ALT SERPL W P-5'-P-CCNC: 31 U/L (ref 1–41)
ANION GAP SERPL CALCULATED.3IONS-SCNC: 11 MMOL/L (ref 5–15)
AST SERPL-CCNC: 14 U/L (ref 1–40)
BACTERIA SPEC AEROBE CULT: NORMAL
BASOPHILS # BLD AUTO: 0.03 10*3/MM3 (ref 0–0.2)
BASOPHILS NFR BLD AUTO: 0.2 % (ref 0–1.5)
BILIRUB SERPL-MCNC: 0.6 MG/DL (ref 0–1.2)
BUN SERPL-MCNC: 22 MG/DL (ref 6–20)
BUN/CREAT SERPL: 22.2 (ref 7–25)
CALCIUM SPEC-SCNC: 9 MG/DL (ref 8.6–10.5)
CHLORIDE SERPL-SCNC: 97 MMOL/L (ref 98–107)
CO2 SERPL-SCNC: 25 MMOL/L (ref 22–29)
CREAT SERPL-MCNC: 0.99 MG/DL (ref 0.76–1.27)
CRP SERPL-MCNC: <0.3 MG/DL (ref 0–0.5)
D DIMER PPP FEU-MCNC: 0.4 MG/L (FEU) (ref 0–0.59)
DEPRECATED RDW RBC AUTO: 42.9 FL (ref 37–54)
EOSINOPHIL # BLD AUTO: 0.04 10*3/MM3 (ref 0–0.4)
EOSINOPHIL NFR BLD AUTO: 0.3 % (ref 0.3–6.2)
ERYTHROCYTE [DISTWIDTH] IN BLOOD BY AUTOMATED COUNT: 13.2 % (ref 12.3–15.4)
FERRITIN SERPL-MCNC: 208.7 NG/ML (ref 30–400)
GFR SERPL CREATININE-BSD FRML MDRD: 84 ML/MIN/1.73
GLOBULIN UR ELPH-MCNC: 3 GM/DL
GLUCOSE SERPL-MCNC: 91 MG/DL (ref 65–99)
HCT VFR BLD AUTO: 48.4 % (ref 37.5–51)
HGB BLD-MCNC: 16.8 G/DL (ref 13–17.7)
IMM GRANULOCYTES # BLD AUTO: 0.31 10*3/MM3 (ref 0–0.05)
IMM GRANULOCYTES NFR BLD AUTO: 2.1 % (ref 0–0.5)
LYMPHOCYTES # BLD AUTO: 1.44 10*3/MM3 (ref 0.7–3.1)
LYMPHOCYTES NFR BLD AUTO: 9.9 % (ref 19.6–45.3)
MCH RBC QN AUTO: 31.1 PG (ref 26.6–33)
MCHC RBC AUTO-ENTMCNC: 34.7 G/DL (ref 31.5–35.7)
MCV RBC AUTO: 89.6 FL (ref 79–97)
MONOCYTES # BLD AUTO: 1.21 10*3/MM3 (ref 0.1–0.9)
MONOCYTES NFR BLD AUTO: 8.3 % (ref 5–12)
NEUTROPHILS NFR BLD AUTO: 11.56 10*3/MM3 (ref 1.7–7)
NEUTROPHILS NFR BLD AUTO: 79.2 % (ref 42.7–76)
NRBC BLD AUTO-RTO: 0 /100 WBC (ref 0–0.2)
PLATELET # BLD AUTO: 347 10*3/MM3 (ref 140–450)
PMV BLD AUTO: 12.1 FL (ref 6–12)
POTASSIUM SERPL-SCNC: 4.1 MMOL/L (ref 3.5–5.2)
PROT SERPL-MCNC: 6.5 G/DL (ref 6–8.5)
RBC # BLD AUTO: 5.4 10*6/MM3 (ref 4.14–5.8)
SODIUM SERPL-SCNC: 133 MMOL/L (ref 136–145)
WBC # BLD AUTO: 14.59 10*3/MM3 (ref 3.4–10.8)

## 2021-09-11 PROCEDURE — 82728 ASSAY OF FERRITIN: CPT | Performed by: NURSE PRACTITIONER

## 2021-09-11 PROCEDURE — 80053 COMPREHEN METABOLIC PANEL: CPT | Performed by: NURSE PRACTITIONER

## 2021-09-11 PROCEDURE — 99239 HOSP IP/OBS DSCHRG MGMT >30: CPT | Performed by: INTERNAL MEDICINE

## 2021-09-11 PROCEDURE — 85025 COMPLETE CBC W/AUTO DIFF WBC: CPT | Performed by: NURSE PRACTITIONER

## 2021-09-11 PROCEDURE — 85379 FIBRIN DEGRADATION QUANT: CPT | Performed by: NURSE PRACTITIONER

## 2021-09-11 PROCEDURE — 86140 C-REACTIVE PROTEIN: CPT | Performed by: NURSE PRACTITIONER

## 2021-09-11 PROCEDURE — 25010000002 ENOXAPARIN PER 10 MG

## 2021-09-11 PROCEDURE — 94799 UNLISTED PULMONARY SVC/PX: CPT

## 2021-09-11 PROCEDURE — 99232 SBSQ HOSP IP/OBS MODERATE 35: CPT | Performed by: INTERNAL MEDICINE

## 2021-09-11 PROCEDURE — 25010000002 DEXAMETHASONE PER 1 MG: Performed by: INTERNAL MEDICINE

## 2021-09-11 RX ORDER — PREDNISONE 10 MG/1
TABLET ORAL
Qty: 50 TABLET | Refills: 0 | Status: SHIPPED | OUTPATIENT
Start: 2021-09-11 | End: 2021-10-01

## 2021-09-11 RX ORDER — ALBUTEROL SULFATE 90 UG/1
2 AEROSOL, METERED RESPIRATORY (INHALATION) EVERY 4 HOURS PRN
Qty: 18 G | Refills: 2 | Status: SHIPPED | OUTPATIENT
Start: 2021-09-11

## 2021-09-11 RX ORDER — BUDESONIDE AND FORMOTEROL FUMARATE DIHYDRATE 160; 4.5 UG/1; UG/1
2 AEROSOL RESPIRATORY (INHALATION)
Qty: 10.2 G | Refills: 3 | Status: SHIPPED | OUTPATIENT
Start: 2021-09-11

## 2021-09-11 RX ADMIN — MINERAL OIL, PETROLATUM: 425; 568 OINTMENT OPHTHALMIC at 09:40

## 2021-09-11 RX ADMIN — ENOXAPARIN SODIUM 40 MG: 40 INJECTION SUBCUTANEOUS at 09:39

## 2021-09-11 RX ADMIN — DEXAMETHASONE SODIUM PHOSPHATE 10 MG: 10 INJECTION INTRAMUSCULAR; INTRAVENOUS at 09:39

## 2021-09-11 RX ADMIN — FLUTICASONE PROPIONATE 2 SPRAY: 50 SPRAY, METERED NASAL at 09:40

## 2021-09-11 RX ADMIN — SODIUM CHLORIDE, PRESERVATIVE FREE 10 ML: 5 INJECTION INTRAVENOUS at 09:40

## 2021-09-11 NOTE — DISCHARGE INSTR - APPOINTMENTS
Call Dr. Walker' office at [phone] to schedule a follow-up appointment for less than 1 week after discharge    Call Dr. Michael's office at [phone] to schedule a follow-up appointment for 3 weeks after discharge

## 2021-09-11 NOTE — OUTREACH NOTE
Prep Survey      Responses   [facility] facility patient discharged from?  [facility]   Is LACE score < 7 ?  No   Emergency Room discharge w/ pulse ox?  No   Eligibility  Readm Mgmt   Discharge diagnosis  PN r/t covid   Does the patient have one of the following disease processes/diagnoses(primary or secondary)?  COVID-19   Does the patient have Home health ordered?  No   Is there a DME ordered?  Yes   What DME was ordered?  Home O2 from [facility]   Prep survey completed?  Yes          Brigida Hoffman RN

## 2021-09-11 NOTE — PLAN OF CARE
Goal Outcome Evaluation:  Plan of Care Reviewed With: patient        Progress: improving  Outcome Summary: Pt on room air at rest, requiring 2L of oxygen with activity. Pt to dc with o2 today. VSS. No acute issues.

## 2021-09-11 NOTE — DISCHARGE SUMMARY
[facility]         HOSPITALIST  DISCHARGE SUMMARY    Patient Name: Sonu Mensah  : 1980  MRN: [id number]    Date of Admission: 2021  Date of Discharge:  2021  Primary Care Physician: Mallorie Walker MD    Consults     Date and Time Order Name Status Description    2021  8:53 AM Inpatient Pulmonology Consult Completed     2021  2:34 AM Hospitalist (on-call MD unless specified) Completed           Active and Resolved Hospital Problems:  Active Hospital Problems    Diagnosis POA   • **Acute respiratory failure with hypoxia (CMS/HCC) [J96.01] Yes   • Pneumonia due to COVID-19 virus [U07.1, J12.82] Yes   • Tobacco abuse [Z72.0] Yes   • COVID-19 [U07.1] Yes      Resolved Hospital Problems   No resolved problems to display.       Hospital Course     Hospital Course:  Sonu Mensah is a 41 y.o. old male patient with no significant past medical history he was diagnosed with Covid 19 on 8/3/21.  He reports has been having symptoms for the previous 8 days.  Symptoms of nonproductive cough, diarrhea, subjective fevers. On arrival to the emergency department the patient was tachypneic, saturating 81% on room air.  Blood work showed an elevated white blood cell count at 17,200.  Chest x-ray showed bilateral infiltrates.  The patient was started on nonrebreather, and subsequently transition to air Vo.    Received inpatient Decadron and remdesivir for COVID-19.    Patient was transitioned down to the low levels of oxygen and discharged home on 2021.  Patient will be sent home with a Symbicort inhaler, as needed albuterol inhaler.  Patient will complete a prolonged steroid taper.  Patient will follow up with pulmonology clinic.  Patient will follow up with PCP.  Patient discharging home on oxygen, requiring very little on date of discharge.  Patient to continue to use bronchopulmonary hygiene lying prone and on his sides frequently.  Patient seen on date of discharge,  clinically and hemodynamically stable.  Patient provided concerning signs and symptoms prompting immediate medical attention, patient understanding and agreeable    DISCHARGE Follow Up Recommendations for labs and diagnostics:   Follow-up PCP in less than 1 week  Follow-up with pulmonology clinic 1 month      Day of Discharge     Vital Signs:  Temp:  [97.7 °F (36.5 °C)-98.1 °F (36.7 °C)] 97.7 °F (36.5 °C)  Heart Rate:  [62-90] 63  Resp:  [20] 20  BP: (108-129)/(63-88) 126/78  Flow (L/min):  [3-6] 3  Physical Exam:               Constitutional: Awake, alert, no increased work of breathing              Eyes: Pupils equal, sclerae anicteric, no conjunctival injection              HENT: NCAT, mucous membranes moist              Neck: Supple, no thyromegaly, no lymphadenopathy, trachea midline              Respiratory: Good aeration heard bilaterally, no crackles no wheezing              Cardiovascular: RRR, no murmurs, rubs, or gallops, palpable pedal pulses bilaterally              Gastrointestinal: Positive bowel sounds, soft, nontender, nondistended              Musculoskeletal: No bilateral ankle edema, no clubbing or cyanosis to extremities              Psychiatric: Appropriate affect, cooperative              Neurologic: Oriented x 3, strength symmetric in all extremities, Cranial Nerves grossly intact to confrontation, speech clear              Skin: No rashes     Discharge Details        Discharge Medications      New Medications      Instructions Start Date   albuterol sulfate  (90 Base) MCG/ACT inhaler  Commonly known as: PROVENTIL HFA;VENTOLIN HFA;PROAIR HFA   2 puffs, Inhalation, Every 4 Hours PRN      budesonide-formoterol 160-4.5 MCG/ACT inhaler  Commonly known as: Symbicort   2 puffs, Inhalation, 2 Times Daily - RT         Changes to Medications      Instructions Start Date   predniSONE 10 MG tablet  Commonly known as: DELTASONE  What changed: See the new instructions.   Take 4 tablets by mouth  Daily for 5 days, THEN 3 tablets Daily for 5 days, THEN 2 tablets Daily for 5 days, THEN 1 tablet Daily for 5 days.   Start Date: September 11, 2021        Continue These Medications      Instructions Start Date   brompheniramine-pseudoephedrine-DM 30-2-10 MG/5ML syrup   10 mL, Oral, 3 Times Daily PRN      promethazine 25 MG tablet  Commonly known as: PHENERGAN   25 mg, Oral, Every 6 Hours PRN      Testosterone Cypionate 200 MG/ML injection  Commonly known as: DEPOTESTOTERONE CYPIONATE   100 mg, Intramuscular, Every 7 Days         Stop These Medications    azithromycin 250 MG tablet  Commonly known as: Zithromax Z-Robert            Allergies   Allergen Reactions   • Penicillins Anaphylaxis       Discharge Disposition:  Home or Self Care    Diet:  Hospital:  Diet Order   Procedures   • Diet Regular       Discharge Activity:       CODE STATUS:  Code Status and Medical Interventions:   Ordered at: 09/05/21 0259     Level Of Support Discussed With:    Patient     Code Status:    CPR     Medical Interventions (Level of Support Prior to Arrest):    Full         No future appointments.    Additional Instructions for the Follow-ups that You Need to Schedule     Discharge Follow-up with PCP   As directed       Currently Documented PCP:    Mallorie Walker MD    PCP Phone Number:    [phone]     Follow Up Details: In less than one week         Discharge Follow-up with Specified Provider: Dr Michael; 3 Weeks   As directed      To: Dr Michael    Follow Up: 3 Weeks               Pertinent  and/or Most Recent Results     PROCEDURES:       LAB RESULTS:      Lab 09/11/21  0619 09/10/21  0525 09/09/21  0503 09/08/21  0425 09/07/21  0411 09/06/21  0619 09/05/21  0105 09/05/21  0105   WBC 14.59* 15.13*  --  15.22* 16.71* 20.52*   < > 17.20*   HEMOGLOBIN 16.8 17.3  --  17.8* 16.8 16.5   < > 16.2   HEMATOCRIT 48.4 51.2*  --  50.7 49.3 49.4   < > 47.6   PLATELETS 347 332  --  351 309 335   < > 313   NEUTROS ABS 11.56* 12.44*  --   12.32* 14.04* 17.14*   < > 14.94*   IMMATURE GRANS (ABS) 0.31* 0.36*  --  0.80*  --  0.94*  --  0.84*   LYMPHS ABS 1.44 1.16  --  0.96  --  1.20  --  0.57*   MONOS ABS 1.21* 1.11*  --  1.06*  --  1.16*  --  0.78   EOS ABS 0.04 0.02  --  0.01  --  0.00  --  0.00   MCV 89.6 90.3  --  87.3 89.6 90.6   < > 89.3   CRP <0.30 0.69* 1.35* 4.63*  --   --   --  5.65*   PROCALCITONIN  --   --   --   --  0.03 0.06  --  0.10  0.08   LDH  --   --   --   --   --   --   --  419*    < > = values in this interval not displayed.         Lab 09/11/21  0619 09/10/21  0525 09/09/21  1352 09/09/21  0503 09/08/21  0425 09/07/21  0411 09/07/21  0411 09/06/21  0619 09/06/21  0619   SODIUM 133* 135* 133* 135* 135*   < > 134*   < > 135*   POTASSIUM 4.1 4.5 4.4 4.3 4.0   < > 4.0   < > 4.5   CHLORIDE 97* 99 97* 99 98   < > 100   < > 101   CO2 25.0 25.7 21.9* 24.9 24.4   < > 21.3*   < > 23.0   ANION GAP 11.0 10.3 14.1 11.1 12.6   < > 12.7   < > 11.0   BUN 22* 21* 24* 23* 25*   < > 22*   < > 18   CREATININE 0.99 1.00 1.28* 1.20 1.41*   < > 1.13   < > 1.15   GLUCOSE 91 107* 232* 110* 111*   < > 76   < > 90   CALCIUM 9.0 9.8 9.1 9.4 9.1   < > 8.7   < > 9.0   MAGNESIUM  --  2.1  --  2.3 2.3  --  2.2  --  2.2    < > = values in this interval not displayed.         Lab 09/11/21  0619 09/10/21  0525 09/09/21  1352 09/08/21  0425 09/07/21  0411 09/06/21  0619 09/06/21  0619 09/05/21  0105 09/05/21  0105   TOTAL PROTEIN 6.5 6.5 7.0 7.2 6.7   < > 6.5   < > 7.3  7.2   ALBUMIN 3.50 3.50 3.60 3.60 3.50   < > 3.30*   < > 3.90  3.80   GLOBULIN 3.0 3.0 3.4 3.6 3.2   < > 3.2   < > 3.4   ALT (SGPT) 31 37 46* 58* 72*   < > 95*   < > 91*  88*   AST (SGOT) 14 13 17 17 23   < > 37   < > 46*  46*   BILIRUBIN 0.6 0.5 0.4 0.5 0.5   < > 0.4   < > 0.3  0.3   BILIRUBIN DIRECT  --   --   --  <0.2 <0.2  --  <0.2  --  <0.2   ALK PHOS 53 53 60 56 61   < > 55   < > 55  53    < > = values in this interval not displayed.         Lab 09/05/21  0105   PROBNP 200.7   TROPONIN  T <0.010             Lab 09/11/21  0619   FERRITIN 208.70         Brief Urine Lab Results     None        Microbiology Results (last 10 days)     Procedure Component Value - Date/Time    Respiratory Culture - Sputum, Cough [[id number]] Collected: 09/08/21 0915    Lab Status: Final result Specimen: Sputum from Cough Updated: 09/10/21 1044     Respiratory Culture Scant growth (1+) Normal Respiratory Sheri: NO S.aureus/MRSA or Pseudomonas aeruginosa     Gram Stain Greater than 25 WBCs per low power field      Less than 20 Epithelial cells per low power field      Many (4+) Budding yeast with hyphae seen      Moderate (3+) Gram positive cocci in pairs and clusters      Occasional Gram positive cocci in chains    Blood Culture - Blood, Arm, Left [[id number]] Collected: 09/05/21 0622    Lab Status: Preliminary result Specimen: Blood from Arm, Left Updated: 09/10/21 1815     Blood Culture No growth at 4 days    Blood Culture - Blood, Arm, Left [[id number]] Collected: 09/05/21 0307    Lab Status: Final result Specimen: Blood from Arm, Left Updated: 09/10/21 0315     Blood Culture No growth at 5 days                           Labs Pending at Discharge:  Pending Labs     Order Current Status    Blood Culture - Blood, Arm, Left Preliminary result            Time spent on Discharge including face to face service:  35 minutes    Electronically signed by Devon Ramirez MD, 09/11/21, 10:06 AM EDT.

## 2021-09-11 NOTE — PROGRESS NOTES
Pulmonary / Critical Care Progress Note      Patient Name: Sonu Mensah  : 1980  MRN: [id number]  Attending:  Devon Ramirez MD  Date of admission: 2021    Subjective   Subjective   Follow up with hypoxic respiratory failure secondary to COVID-19.    21-->COVID +, Currently receiving Baricitinib. Has completed Remdesivir.    Over past 24 hours, continues to wean O2.  Continues on baricitinib, steroids, and nebulizers.     No acute events overnight.     Today,  Walking around room  Wean himself off O2 last night  Does desat with exertion  Will need 6MWT prior to discharge  Very energetic and wants to go home this morning  Dyspnea improving  Nonproductive cough  Proning himself during day/night  Using IS and flutter valve  No chest pain  No fever or chills     Review of Systems  General: + fatigue, otherwise denied complaints  Cardiovascular:  + GREGG (improving), otherwise denied complaints  Respiratory: + cough (improving), +dyspnea (improving), otherwise denied complaints  Gastrointestinal: Denied complaints     Objective   Objective     Vitals:   Temp:  [97.34 °F (36.3 °C)-98.1 °F (36.7 °C)] 97.8 °F (36.6 °C)  Heart Rate:  [62-90] 62  Resp:  [20] 20  BP: (108-129)/(63-88) 113/63  Flow (L/min):  [3-6] 3    Physical Exam   Vital Signs Reviewed   General: WDWN male, Awake and Alert, NAD on room air  HEENT:  PERRL, EOMI.  OP, nares clear  Chest:  good aeration, diminished bilaterally, tympanic to percussion bilaterally, no work of breathing noted  CV: RRR, no MGR, pulses 2+, equal  Abd:  Soft, NT, ND, + BS, no HSM  EXT:  no clubbing, no cyanosis, no edema  Neuro:  A&Ox3, CN grossly intact, no focal deficits  Skin: No rashes or lesions noted    Result Review    Result Review:  I have personally reviewed the results from the time of this admission to 2021 06:30 EDT and agree with these findings:  [x]  Laboratory  [x]  Microbiology  [x]  Radiology  [x]  EKG/Telemetry   []  Cardiology/Vascular   []   Pathology  []  Old records  []  Other:  Most notable findings include: WBC 14.59, Cr 0.99, K+ 4.1    COVID LABS:  Results From Last 14 Days   Lab Units 09/10/21  0525 09/09/21  0503 09/08/21  0425 09/07/21  0411 09/06/21  0619 09/05/21  0105 09/05/21  0105   PROBNP pg/mL  --   --   --   --   --   --  200.7   CRP mg/dL 0.69* 1.35* 4.63*  --   --    < > 5.65*   D DIMER QUANT mg/L (FEU) 0.51 0.41 0.60*  --   --    < > 0.83*   FERRITIN ng/mL 185.60 180.00 189.30  --   --    < > 242.20   LDH U/L  --   --   --   --   --   --  419*   PROCALCITONIN ng/mL  --   --   --  0.03 0.06  --  0.10  0.08   TROPONIN T ng/mL  --   --   --   --   --   --  <0.010    < > = values in this interval not displayed.     Assessment/Plan   Assessment / Plan     Active Hospital Problems:  Active Hospital Problems    Diagnosis    • **Acute respiratory failure with hypoxia (CMS/HCC)    • Pneumonia due to COVID-19 virus    • Tobacco abuse    • COVID-19      Impression:  Acute hypoxic respiratory failure secondary to COVID-19 quiring air Vo  ARDS  COVID-19 pneumonia  Question community-acquired pneumonia from unspecified organism   Therapeutic drug monitoring  Hyponatremia  Elevated liver enzymes  Leukocytosis  DARY secondary to prerenal etiology versus acute tubular necrosis     Plan:  On room air.    Desats with exertion.  Will need 6MWT prior to discharge.  Completed 5 day course of Remdesivir.  Continue Baricitinib to complete 14 days, day 4.  Daily monitoring of renal panel and LFTs.  Continue to trend inflammatory markers.  Continue Decadron 10 mg IV daily to complete 10 day course.  Would recommend switching to Prednisone 40mg and decreasing 10 mg every 3 days until off steroids.  Continue nebulizers and bronchopulmonary hygiene.  Encourage IS and flutter valve. Continue to use at home.  Encourage prone and laying side to side.  Up to chair when not prone.     Stable for discharge from pulmonary standpoint.  6MWT prior to discharge.  2 week  steroid taper.       DVT prophylaxis:  Medical and mechanical DVT prophylaxis orders are present.    CODE STATUS:   Level Of Support Discussed With: Patient  Code Status: CPR  Medical Interventions (Level of Support Prior to Arrest): Full      Labs, microbiology, radiology, medications, and provider notes personally reviewed.  Discussed with primary service and bedside RN    I will sign off.  Please call with questions.    Electronically signed by REYNOLD Claros, 09/11/21, 9:55 AM EDT.  Electronically signed by Malcolm Michael MD, 09/11/21, 10:47 AM EDT.

## 2021-09-11 NOTE — NURSING NOTE
Exercise Oximetry    Patient Name:Sonu Mensah   MRN: [id number]   Date: 09/11/21             ROOM AIR BASELINE   SpO2% 88           EXERCISE ON ROOM AIR SpO2% EXERCISE ON O2 @ 2 LPM SpO2%   1 MINUTE 87 1 MINUTE  89     2 MINUTES  90     3 MINUTES  90     4 MINUTES  90     5 MINUTES  89     6 MINUTES  91

## 2021-09-11 NOTE — PLAN OF CARE
Goal Outcome Evaluation:  Plan of Care Reviewed With: patient        Progress: improving   Patient has been trying to wean self off of oxygen this shift. Patient has been on room air tonight with O2 sats of 88-90s. Patient is wanting to discharge home today. Vital signs stable. Will continue to monitor. Jacqui Stover RN

## 2021-09-12 ENCOUNTER — READMISSION MANAGEMENT (OUTPATIENT)
Dept: CALL CENTER | Facility: HOSPITAL | Age: 41
End: 2021-09-12

## 2021-09-12 NOTE — OUTREACH NOTE
COVID-19 Week 1 Survey      Responses   [facility] patient discharged from?  [facility]   Does the patient have one of the following disease processes/diagnoses(primary or secondary)?  COVID-19   COVID-19 underlying condition?  None   Call Number  Call 1   Week 1 Call successful?  Yes   Call start time  1143   Call end time  1148   Discharge diagnosis  PN r/t covid   Meds reviewed with patient/caregiver?  Yes   Is the patient having any side effects they believe may be caused by any medication additions or changes?  No   Does the patient have all medications ordered at discharge?  Yes   Is the patient taking all medications as directed (includes completed medication regime)?  Yes   Does the patient have a primary care provider?   Yes   Does the patient have an appointment with their PCP or specialist within 7 days of discharge?  Yes [9/16/21 with PCP]   Has the patient kept scheduled appointments due by today?  N/A   Has home health visited the patient within 72 hours of discharge?  N/A   What DME was ordered?  Home O2 from [place]   Has all DME been delivered?  Yes   Psychosocial issues?  No   Comments  loss 25lbs , wife is RN   Did the patient receive a copy of their discharge instructions?  Yes   Did the patient receive a copy of COVID-19 specific instructions?  Yes   Nursing interventions  Reviewed instructions with patient   What is the patient's perception of their health status since discharge?  Improving   Does the patient have any of the following symptoms?  Shortness of breath   Nursing Interventions  Nurse provided patient education   Pulse Ox monitoring  Intermittent   Pulse Ox device source  Patient   O2 Sat comments  93-94% on RA   O2 Sat: education provided  Sat levels, Monitoring frequency, When to seek care   O2 Sat education comments  wife, RN,  watches pt closely and will take pt to ED for O2 sats <90%   Is the patient/caregiver able to teach back steps to recovery at home?  Rest and rebuild  strength, gradually increase activity, Set small, achievable goals for return to baseline health, Eat a well-balance diet   If the patient is a current smoker, are they able to teach back resources for cessation?  Not a smoker   Is the patient/caregiver able to teach back the hierarchy of who to call/visit for symptoms/problems? PCP, Specialist, Home health nurse, Urgent Care, ED, 911  Yes   COVID-19 call completed?  Yes          Erica Smith RN

## 2021-09-13 ENCOUNTER — READMISSION MANAGEMENT (OUTPATIENT)
Dept: CALL CENTER | Facility: HOSPITAL | Age: 41
End: 2021-09-13

## 2021-09-13 NOTE — OUTREACH NOTE
COVID-19 Week 1 Survey      Responses   [facility] patient discharged from?  [facility]   Does the patient have one of the following disease processes/diagnoses(primary or secondary)?  COVID-19   COVID-19 underlying condition?  None   Call Number  Call 2   Week 1 Call successful?  Yes   Call start time  1017   Call end time  1020   Discharge diagnosis  PN r/t covid   Is patient permission given to speak with other caregiver?  Yes   List who call center can speak with  wife   Has the patient kept scheduled appointments due by today?  N/A   What DME was ordered?  Home O2 from [place]   Has all DME been delivered?  Yes   DME comments  wearing O2 prn   Psychosocial issues?  No   Comments  loss 25lbs , wife is RN, doing IS for deep breathing at home.   What is the patient's perception of their health status since discharge?  Improving   Does the patient have any of the following symptoms?  Shortness of breath   Nursing Interventions  Nurse provided patient education   Pulse Ox monitoring  Intermittent   Pulse Ox device source  Patient   O2 Sat comments  93-94% on RA sitting, up walking RA 89%   O2 Sat: education provided  Monitoring frequency, Sat levels   Is the patient/caregiver able to teach back steps to recovery at home?  Eat a well-balance diet   Is the patient/caregiver able to teach back the hierarchy of who to call/visit for symptoms/problems? PCP, Specialist, Home health nurse, Urgent Care, ED, 911  Yes   COVID-19 call completed?  Yes          Afsaneh Escobar RN

## 2021-09-14 ENCOUNTER — READMISSION MANAGEMENT (OUTPATIENT)
Dept: CALL CENTER | Facility: HOSPITAL | Age: 41
End: 2021-09-14

## 2021-09-14 NOTE — OUTREACH NOTE
COVID-19 Week 1 Survey      Responses   [facility] patient discharged from?  [facility]   Does the patient have one of the following disease processes/diagnoses(primary or secondary)?  COVID-19   COVID-19 underlying condition?  None   Call Number  Call 3   Week 1 Call successful?  No   Discharge diagnosis  PN r/t ilana [facility] RN

## 2021-09-20 ENCOUNTER — READMISSION MANAGEMENT (OUTPATIENT)
Dept: CALL CENTER | Facility: HOSPITAL | Age: 41
End: 2021-09-20

## 2021-09-20 NOTE — OUTREACH NOTE
COVID-19 Week 2 Survey      Responses   [facility] patient discharged from?  [facility]   Does the patient have one of the following disease processes/diagnoses(primary or secondary)?  COVID-19   COVID-19 underlying condition?  None   Call Number  Call 1   COVID-19 Week 2: Call 1 attempt successful?  No   Discharge diagnosis  PN r/t caleid          Claudette Key RN

## 2024-07-06 ENCOUNTER — APPOINTMENT (OUTPATIENT)
Dept: ULTRASOUND IMAGING | Facility: HOSPITAL | Age: 44
End: 2024-07-06
Payer: COMMERCIAL

## 2024-07-06 ENCOUNTER — APPOINTMENT (OUTPATIENT)
Dept: CT IMAGING | Facility: HOSPITAL | Age: 44
End: 2024-07-06
Payer: COMMERCIAL

## 2024-07-06 ENCOUNTER — HOSPITAL ENCOUNTER (EMERGENCY)
Facility: HOSPITAL | Age: 44
Discharge: HOME | End: 2024-07-06
Attending: EMERGENCY MEDICINE
Payer: COMMERCIAL

## 2024-07-06 VITALS
WEIGHT: 205.03 LBS | OXYGEN SATURATION: 96 % | RESPIRATION RATE: 16 BRPM | TEMPERATURE: 98.1 F | HEART RATE: 70 BPM | DIASTOLIC BLOOD PRESSURE: 94 MMHG | SYSTOLIC BLOOD PRESSURE: 143 MMHG | HEIGHT: 69 IN | BODY MASS INDEX: 30.37 KG/M2

## 2024-07-06 DIAGNOSIS — S30.22XA CONTUSION OF TESTIS, INITIAL ENCOUNTER: Primary | ICD-10-CM

## 2024-07-06 DIAGNOSIS — T14.8XXA SKIN AVULSION: ICD-10-CM

## 2024-07-06 DIAGNOSIS — S30.201A TRAUMATIC HEMATOMA OF TESTICLE: ICD-10-CM

## 2024-07-06 LAB
ALBUMIN SERPL-MCNC: 4.6 G/DL (ref 3.5–5.2)
ALBUMIN/GLOB SERPL: 2.1 G/DL
ALP SERPL-CCNC: 38 U/L (ref 39–117)
ALT SERPL W P-5'-P-CCNC: 19 U/L (ref 1–41)
ANION GAP SERPL CALCULATED.3IONS-SCNC: 9 MMOL/L (ref 5–15)
AST SERPL-CCNC: 22 U/L (ref 1–40)
BASOPHILS # BLD AUTO: 0.1 10*3/MM3 (ref 0–0.2)
BASOPHILS NFR BLD AUTO: 1.2 % (ref 0–1.5)
BILIRUB SERPL-MCNC: 0.3 MG/DL (ref 0–1.2)
BILIRUB UR QL STRIP: NEGATIVE
BUN SERPL-MCNC: 10 MG/DL (ref 6–20)
BUN/CREAT SERPL: 9.6 (ref 7–25)
CALCIUM SPEC-SCNC: 9.4 MG/DL (ref 8.6–10.5)
CHLORIDE SERPL-SCNC: 104 MMOL/L (ref 98–107)
CLARITY UR: CLEAR
CO2 SERPL-SCNC: 26 MMOL/L (ref 22–29)
COLOR UR: YELLOW
CREAT SERPL-MCNC: 1.04 MG/DL (ref 0.76–1.27)
DEPRECATED RDW RBC AUTO: 45.1 FL (ref 37–54)
EGFRCR SERPLBLD CKD-EPI 2021: 91.9 ML/MIN/1.73
EOSINOPHIL # BLD AUTO: 0.15 10*3/MM3 (ref 0–0.4)
EOSINOPHIL NFR BLD AUTO: 1.8 % (ref 0.3–6.2)
ERYTHROCYTE [DISTWIDTH] IN BLOOD BY AUTOMATED COUNT: 13.2 % (ref 12.3–15.4)
GLOBULIN UR ELPH-MCNC: 2.2 GM/DL
GLUCOSE SERPL-MCNC: 77 MG/DL (ref 65–99)
GLUCOSE UR STRIP-MCNC: NEGATIVE MG/DL
HCT VFR BLD AUTO: 42.8 % (ref 37.5–51)
HGB BLD-MCNC: 14.7 G/DL (ref 13–17.7)
HGB UR QL STRIP.AUTO: NEGATIVE
IMM GRANULOCYTES # BLD AUTO: 0.01 10*3/MM3 (ref 0–0.05)
IMM GRANULOCYTES NFR BLD AUTO: 0.1 % (ref 0–0.5)
KETONES UR QL STRIP: NEGATIVE
LEUKOCYTE ESTERASE UR QL STRIP.AUTO: NEGATIVE
LYMPHOCYTES # BLD AUTO: 1.39 10*3/MM3 (ref 0.7–3.1)
LYMPHOCYTES NFR BLD AUTO: 16.3 % (ref 19.6–45.3)
MCH RBC QN AUTO: 32 PG (ref 26.6–33)
MCHC RBC AUTO-ENTMCNC: 34.3 G/DL (ref 31.5–35.7)
MCV RBC AUTO: 93 FL (ref 79–97)
MONOCYTES # BLD AUTO: 0.94 10*3/MM3 (ref 0.1–0.9)
MONOCYTES NFR BLD AUTO: 11 % (ref 5–12)
NEUTROPHILS NFR BLD AUTO: 5.92 10*3/MM3 (ref 1.7–7)
NEUTROPHILS NFR BLD AUTO: 69.6 % (ref 42.7–76)
NITRITE UR QL STRIP: NEGATIVE
NRBC BLD AUTO-RTO: 0 /100 WBC (ref 0–0.2)
PH UR STRIP.AUTO: 5.5 [PH] (ref 5–8)
PLATELET # BLD AUTO: 169 10*3/MM3 (ref 140–450)
PMV BLD AUTO: 12.5 FL (ref 6–12)
POTASSIUM SERPL-SCNC: 4 MMOL/L (ref 3.5–5.2)
PROT SERPL-MCNC: 6.8 G/DL (ref 6–8.5)
PROT UR QL STRIP: NEGATIVE
RBC # BLD AUTO: 4.6 10*6/MM3 (ref 4.14–5.8)
SODIUM SERPL-SCNC: 139 MMOL/L (ref 136–145)
SP GR UR STRIP: 1.01 (ref 1–1.03)
UROBILINOGEN UR QL STRIP: NORMAL
WBC NRBC COR # BLD AUTO: 8.51 10*3/MM3 (ref 3.4–10.8)

## 2024-07-06 PROCEDURE — 85025 COMPLETE CBC W/AUTO DIFF WBC: CPT | Performed by: NURSE PRACTITIONER

## 2024-07-06 PROCEDURE — 80053 COMPREHEN METABOLIC PANEL: CPT | Performed by: NURSE PRACTITIONER

## 2024-07-06 PROCEDURE — 76870 US EXAM SCROTUM: CPT

## 2024-07-06 PROCEDURE — 25010000002 KETOROLAC TROMETHAMINE PER 15 MG: Performed by: NURSE PRACTITIONER

## 2024-07-06 PROCEDURE — 25510000001 IOPAMIDOL PER 1 ML: Performed by: EMERGENCY MEDICINE

## 2024-07-06 PROCEDURE — 99285 EMERGENCY DEPT VISIT HI MDM: CPT

## 2024-07-06 PROCEDURE — 96374 THER/PROPH/DIAG INJ IV PUSH: CPT

## 2024-07-06 PROCEDURE — 81003 URINALYSIS AUTO W/O SCOPE: CPT | Performed by: NURSE PRACTITIONER

## 2024-07-06 PROCEDURE — 72193 CT PELVIS W/DYE: CPT

## 2024-07-06 RX ORDER — KETOROLAC TROMETHAMINE 10 MG/1
10 TABLET, FILM COATED ORAL EVERY 6 HOURS PRN
Qty: 20 TABLET | Refills: 0 | Status: SHIPPED | OUTPATIENT
Start: 2024-07-06

## 2024-07-06 RX ORDER — KETOROLAC TROMETHAMINE 30 MG/ML
30 INJECTION, SOLUTION INTRAMUSCULAR; INTRAVENOUS ONCE
Status: COMPLETED | OUTPATIENT
Start: 2024-07-06 | End: 2024-07-06

## 2024-07-06 RX ORDER — OMEPRAZOLE 20 MG/1
20 CAPSULE, DELAYED RELEASE ORAL DAILY
COMMUNITY

## 2024-07-06 RX ADMIN — IOPAMIDOL 100 ML: 755 INJECTION, SOLUTION INTRAVENOUS at 22:34

## 2024-07-06 RX ADMIN — KETOROLAC TROMETHAMINE 30 MG: 30 INJECTION, SOLUTION INTRAMUSCULAR; INTRAVENOUS at 23:07

## 2024-07-07 NOTE — ED PROVIDER NOTES
Time: 8:46 PM EDT  Date of encounter:  7/6/2024  Independent Historian/Clinical History and Information was obtained by:   Patient and Family    History is limited by: N/A    Chief Complaint: Testicle pain      History of Present Illness:  Patient is a 44 y.o. year old male who presents to the emergency department for evaluation of left testicle pain.  Patient states he was climbing up the ladder to go down a homemade slide when the board of the ladder broke and made him fall hitting his left testicle on a piece of wood and scraping the skin.  Pain currently 7 out of 10.  Complaining of swelling and pain    HPI    Patient Care Team  Primary Care Provider: Mallorie Walker MD    Past Medical History:     Allergies   Allergen Reactions    Penicillins Anaphylaxis     Past Medical History:   Diagnosis Date    COVID-19      Past Surgical History:   Procedure Laterality Date    FRACTURE SURGERY      HERNIA REPAIR      LEG SURGERY       No family history on file.    Home Medications:  Prior to Admission medications    Medication Sig Start Date End Date Taking? Authorizing Provider   albuterol sulfate  (90 Base) MCG/ACT inhaler Inhale 2 puffs Every 4 (Four) Hours As Needed for Wheezing. 9/11/21   Devon Ramirez MD   brompheniramine-pseudoephedrine-DM 30-2-10 MG/5ML syrup Take 10 mL by mouth 3 (Three) Times a Day As Needed for Congestion. 8/31/21   Amos Murphy MD   budesonide-formoterol (Symbicort) 160-4.5 MCG/ACT inhaler Inhale 2 puffs 2 (Two) Times a Day. 9/11/21   Devon Ramirez MD   promethazine (PHENERGAN) 25 MG tablet Take 1 tablet by mouth Every 6 (Six) Hours As Needed for Nausea or Vomiting. 8/31/21   Amos Murphy MD   Testosterone Cypionate (DEPOTESTOTERONE CYPIONATE) 200 MG/ML injection Inject 100 mg into the appropriate muscle as directed by prescriber Every 7 (Seven) Days. 8/15/21   Emergency, Nurse Evgeny, RN        Social History:   Social History     Tobacco Use    Smoking status: Never     "Smokeless tobacco: Current     Types: Chew   Vaping Use    Vaping status: Never Used   Substance Use Topics    Alcohol use: Yes     Comment: occ    Drug use: Never         Review of Systems:  Review of Systems   Gastrointestinal:  Negative for abdominal pain.   Genitourinary:  Positive for scrotal swelling and testicular pain. Negative for flank pain, hematuria, penile pain and penile swelling.   Musculoskeletal:  Negative for back pain.   Skin:  Positive for wound (\"Road rash\" left testicle).   Psychiatric/Behavioral: Negative.     All other systems reviewed and are negative.       Physical Exam:  /79 (BP Location: Left arm, Patient Position: Lying)   Pulse 86   Temp 97.6 °F (36.4 °C) (Oral)   Resp 18   Ht 175.3 cm (69\")   Wt 93 kg (205 lb 0.4 oz)   SpO2 94%   BMI 30.28 kg/m²     Physical Exam  Vitals and nursing note reviewed.   Constitutional:       Appearance: Normal appearance.   HENT:      Nose: Nose normal.      Mouth/Throat:      Mouth: Mucous membranes are moist.   Eyes:      Conjunctiva/sclera: Conjunctivae normal.   Pulmonary:      Effort: Pulmonary effort is normal.   Abdominal:      General: Bowel sounds are normal.      Palpations: Abdomen is soft.      Tenderness: There is no abdominal tenderness. There is no right CVA tenderness or left CVA tenderness.   Genitourinary:     Penis: Normal.       Comments: Superficial skin avulsions to left scrotum.  Tenderness to left testicle with swelling in the proximal scrotum.  Questionable hematoma at that location.    Positive cremasteric reflexes  Musculoskeletal:      Cervical back: Normal range of motion.   Skin:     General: Skin is warm and dry.      Comments: No active bleeding from skin avulsions.  They are superficial in nature and nonrepairable to left testicle   Neurological:      Mental Status: He is alert and oriented to person, place, and time.   Psychiatric:         Mood and Affect: Mood normal.         Behavior: Behavior normal.      "             Medical Decision Making:      Comorbidities that affect care:    None    External Notes reviewed:    Previous Admission Note: Patient's last visit and admission was back in 2021 for COVID-19 with pneumonia and respiratory failure.  No visit since      The following orders were placed and all results were independently analyzed by me:  Orders Placed This Encounter   Procedures    US Scrotum & Testicles    CT Pelvis With Contrast    Urinalysis With Microscopic If Indicated (No Culture) - Urine, Clean Catch    Comprehensive Metabolic Panel    CBC Auto Differential    CBC & Differential       Medications Given in the Emergency Department:  Medications   ketorolac (TORADOL) injection 30 mg (has no administration in time range)   iopamidol (ISOVUE-370) 76 % injection 100 mL (100 mL Intravenous Given 7/6/24 2234)        ED Course:    ED Course as of 07/06/24 2301   Sat Jul 06, 2024 2253 CT Pelvis With Contrast  Possible hematoma left testicle [DS]      ED Course User Index  [DS] Silvana Akers APRN       Labs:    Lab Results (last 24 hours)       Procedure Component Value Units Date/Time    CBC & Differential [[id number]]  (Abnormal) Collected: 07/06/24 2215    Specimen: Blood from Arm, Left Updated: 07/06/24 2226    Narrative:      The following orders were created for panel order CBC & Differential.  Procedure                               Abnormality         Status                     ---------                               -----------         ------                     CBC Auto Differential[[id number]]        Abnormal            Final result                 Please view results for these tests on the individual orders.    Comprehensive Metabolic Panel [[id number]]  (Abnormal) Collected: 07/06/24 2215    Specimen: Blood from Arm, Left Updated: 07/06/24 2246     Glucose 77 mg/dL      BUN 10 mg/dL      Creatinine 1.04 mg/dL      Sodium 139 mmol/L      Potassium 4.0 mmol/L      Chloride 104 mmol/L      CO2 26.0  mmol/L      Calcium 9.4 mg/dL      Total Protein 6.8 g/dL      Albumin 4.6 g/dL      ALT (SGPT) 19 U/L      AST (SGOT) 22 U/L      Alkaline Phosphatase 38 U/L      Total Bilirubin 0.3 mg/dL      Globulin 2.2 gm/dL      A/G Ratio 2.1 g/dL      BUN/Creatinine Ratio 9.6     Anion Gap 9.0 mmol/L      eGFR 91.9 mL/min/1.73     Narrative:      GFR Normal >60  Chronic Kidney Disease <60  Kidney Failure <15      CBC Auto Differential [[id number]]  (Abnormal) Collected: 07/06/24 2215    Specimen: Blood from Arm, Left Updated: 07/06/24 2226     WBC 8.51 10*3/mm3      RBC 4.60 10*6/mm3      Hemoglobin 14.7 g/dL      Hematocrit 42.8 %      MCV 93.0 fL      MCH 32.0 pg      MCHC 34.3 g/dL      RDW 13.2 %      RDW-SD 45.1 fl      MPV 12.5 fL      Platelets 169 10*3/mm3      Neutrophil % 69.6 %      Lymphocyte % 16.3 %      Monocyte % 11.0 %      Eosinophil % 1.8 %      Basophil % 1.2 %      Immature Grans % 0.1 %      Neutrophils, Absolute 5.92 10*3/mm3      Lymphocytes, Absolute 1.39 10*3/mm3      Monocytes, Absolute 0.94 10*3/mm3      Eosinophils, Absolute 0.15 10*3/mm3      Basophils, Absolute 0.10 10*3/mm3      Immature Grans, Absolute 0.01 10*3/mm3      nRBC 0.0 /100 WBC     Urinalysis With Microscopic If Indicated (No Culture) - Urine, Clean Catch [[id number]]  (Normal) Collected: 07/06/24 2217    Specimen: Urine, Clean Catch Updated: 07/06/24 2230     Color, UA Yellow     Appearance, UA Clear     pH, UA 5.5     Specific Gravity, UA 1.014     Glucose, UA Negative     Ketones, UA Negative     Bilirubin, UA Negative     Blood, UA Negative     Protein, UA Negative     Leuk Esterase, UA Negative     Nitrite, UA Negative     Urobilinogen, UA 0.2 E.U./dL    Narrative:      Urine microscopic not indicated.             Imaging:    CT Pelvis With Contrast    Result Date: 7/6/2024  CT PELVIS W CONTRAST Date of Exam: 7/6/2024 10:29 PM EDT Indication: Left testicular pain after a fall today. Comparison: Scrotal Doppler ultrasound same  day Technique: Axial CT images were obtained of the pelvis after the uneventful intravenous administration of iodinated contrast.  Reconstructed coronal and sagittal images were also obtained. Automated exposure control and iterative construction methods were used. Findings: There are some dystrophic calcifications in the prostate gland. Urinary bladder is normal. The appendix is normal. Visualized large and small bowel are normal as well. No adenopathy or free fluid is seen. No left-sided wall hernia. A tiny fat-containing right inguinal hernia is noted. There is some heterogeneous material in the left upper hemiscrotum that appears to correspond to heterogeneous soft tissue seen on the Doppler ultrasound today. By ultrasound, this was felt to represent an inguinal hernia.  This is not the case by CT, and this could reflect hematoma or possibly injury to the distal left spermatic cord. Urology follow-up is recommended. There is degenerative disease at L4-5 and L5-S1. No acute fractures.     1. No inguinal hernia is identified on the left. There is a tiny fat-containing inguinal hernia on the right. 2. Heterogeneous soft tissue in the upper left hemiscrotum appears to correspond to abnormality by ultrasound today. As there is no left-sided inguinal hernia, this tissue may be the result of hematoma or possibly injury to the distal spermatic cord on the left side. Urology follow-up recommended. 3. Otherwise negative. Electronically Signed: Steve Foster MD  7/6/2024 10:49 PM EDT  Workstation ID: [id number]    US Scrotum & Testicles    Result Date: 7/6/2024  US SCROTUM AND TESTICLES Date of Exam: 7/6/2024 8:57 PM EDT Indication: Left-sided testicular pain after injury. Comparison: No comparisons available. Technique: Multiple sonographic images of the scrotum were obtained in transverse and longitudinal planes. Grayscale and color Doppler duplex techniques were utilized.  Doppler spectral analysis was performed.  Findings: The right testicle measures 3.6 cm. The left testicle measures 3.5 cm. Both are morphologically normal. There are no intratesticular masses. Both testicles show perfusion by Doppler. The epididymides are grossly normal as well. Small bilateral mildly complex hydroceles are present. Abnormal soft tissue superior to the left testicle probably reflects an inguinal hernia.     1. Both testicles are normal, and both testicles show perfusion by Doppler. 2. Small bilateral mildly complex hydroceles. 3. Probable left inguinal hernia. Electronically Signed: Steve Foster MD  7/6/2024 9:29 PM EDT  Workstation ID: [id number]       Differential Diagnosis and Discussion:    Testicular Pain: Differential diagnosis includes but is not limited to epididymitis, orchitis, testicular torsion, testicular tumor, testicular trauma, hydrocele, varicocele, spermatocele, prostatitis, scrotal cellulitis, and urolithiasis.    All labs were reviewed and interpreted by me.  Ultrasound impression was interpreted by me.     MDM  Number of Diagnoses or Management Options  Contusion of testis, initial encounter  Skin avulsion  Traumatic hematoma of testicle  Diagnosis management comments: I have explained the patient´s condition, diagnoses and treatment plan based on the information available to me at this time. I have answered questions and addressed any concerns. The patient has a good  understanding of the patient´s diagnosis, condition, and treatment plan as can be expected at this point. The vital signs have been stable. The patient´s condition is stable and appropriate for discharge from the emergency department.      The patient will pursue further outpatient evaluation with the primary care physician or other designated or consulting physician as outlined in the discharge instructions. They are agreeable to this plan of care and follow-up instructions have been explained in detail. The patient has received these instructions in  written format and have expressed an understanding of the discharge instructions. The patient is aware that any significant change in condition or worsening of symptoms should prompt an immediate return to this or the closest emergency department or call to 911.       Amount and/or Complexity of Data Reviewed  Clinical lab tests: reviewed and ordered  Tests in the radiology section of CPT®: reviewed and ordered  Tests in the medicine section of CPT®: ordered and reviewed  Obtain history from someone other than the patient: yes (Spouse)    Risk of Complications, Morbidity, and/or Mortality  Presenting problems: moderate  Diagnostic procedures: moderate  Management options: low    Patient Progress  Patient progress: stable           Patient Care Considerations:    NARCOTICS: I considered prescribing opiate pain medication as an outpatient, however patient refused.  He states he is a  for living and can have narcotic medication and does not want any      Consultants/Shared Management Plan:    None    Social Determinants of Health:    Patient has presented with family members who are responsible, reliable and will ensure follow up care.      Disposition and Care Coordination:    Discharged: I considered escalation of care by admitting this patient to the hospital, however no emergent findings on imaging that require immediate intervention    I have explained the patient´s condition, diagnoses and treatment plan based on the information available to me at this time. I have answered questions and addressed any concerns. The patient has a good  understanding of the patient´s diagnosis, condition, and treatment plan as can be expected at this point. The vital signs have been stable. The patient´s condition is stable and appropriate for discharge from the emergency department.      The patient will pursue further outpatient evaluation with the primary care physician or other designated or consulting physician as  outlined in the discharge instructions. They are agreeable to this plan of care and follow-up instructions have been explained in detail. The patient has received these instructions in written format and has expressed an understanding of the discharge instructions. The patient is aware that any significant change in condition or worsening of symptoms should prompt an immediate return to this or the closest emergency department or call to 911.  I have explained discharge medications and the need for follow up with the patient/caretakers. This was also printed in the discharge instructions. Patient was discharged with the following medications and follow up:      Medication List        New Prescriptions      ketorolac 10 MG tablet  Commonly known as: TORADOL  Take 1 tablet by mouth Every 6 (Six) Hours As Needed for Moderate Pain, Severe Pain or Mild Pain.            Stop      albuterol sulfate  (90 Base) MCG/ACT inhaler  Commonly known as: PROVENTIL HFA;VENTOLIN HFA;PROAIR HFA     brompheniramine-pseudoephedrine-DM 30-2-10 MG/5ML syrup     budesonide-formoterol 160-4.5 MCG/ACT inhaler  Commonly known as: Symbicort     promethazine 25 MG tablet  Commonly known as: PHENERGAN     Testosterone Cypionate 200 MG/ML injection  Commonly known as: DEPOTESTOTERONE CYPIONATE               Where to Get Your Medications        These medications were sent to [id number] - [place], [place] - [address] AT [facility] [place] - [phone] [facility] [phone] FX  [address] [facility] [place] [address]      Phone: [phone]   ketorolac 10 MG tablet      Your urologist in [place]    Call on 7/8/2024         Final diagnoses:   Contusion of testis, initial encounter   Skin avulsion   Traumatic hematoma of testicle        ED Disposition       ED Disposition   Discharge    Condition   Stable    Comment   --               This medical record created using voice recognition software.              Silvana Akers, APRN  07/06/24 2231

## 2024-07-07 NOTE — ED TRIAGE NOTES
"Pt comes to the ER tonight for left testicle pain. Pt states that he was going down a homemade slide and a board broke and hit him in his left testicle. Pt states \"I look like I have roadrash on my left testicle.\"  "

## 2024-07-07 NOTE — DISCHARGE INSTRUCTIONS
Follow-up with your urologist in [place] to see if they would like to see you before your upcoming regular appointment due to this new issue and possible hematoma of spermatic cord seen on imaging.    Rest.  Ice.  Scrotal elevation.    Toradol for pain.